# Patient Record
Sex: FEMALE | Race: WHITE | NOT HISPANIC OR LATINO | Employment: OTHER | URBAN - METROPOLITAN AREA
[De-identification: names, ages, dates, MRNs, and addresses within clinical notes are randomized per-mention and may not be internally consistent; named-entity substitution may affect disease eponyms.]

---

## 2017-03-03 ENCOUNTER — OFFICE VISIT (OUTPATIENT)
Dept: MEDICAL GROUP | Age: 82
End: 2017-03-03
Payer: MEDICARE

## 2017-03-03 VITALS
DIASTOLIC BLOOD PRESSURE: 88 MMHG | HEIGHT: 66 IN | TEMPERATURE: 99.7 F | WEIGHT: 158 LBS | BODY MASS INDEX: 25.39 KG/M2 | HEART RATE: 71 BPM | SYSTOLIC BLOOD PRESSURE: 140 MMHG | OXYGEN SATURATION: 97 %

## 2017-03-03 DIAGNOSIS — R21 SKIN RASH: ICD-10-CM

## 2017-03-03 PROCEDURE — G8420 CALC BMI NORM PARAMETERS: HCPCS | Performed by: FAMILY MEDICINE

## 2017-03-03 PROCEDURE — G8432 DEP SCR NOT DOC, RNG: HCPCS | Performed by: FAMILY MEDICINE

## 2017-03-03 PROCEDURE — G8482 FLU IMMUNIZE ORDER/ADMIN: HCPCS | Performed by: FAMILY MEDICINE

## 2017-03-03 PROCEDURE — 4040F PNEUMOC VAC/ADMIN/RCVD: CPT | Performed by: FAMILY MEDICINE

## 2017-03-03 PROCEDURE — 1101F PT FALLS ASSESS-DOCD LE1/YR: CPT | Performed by: FAMILY MEDICINE

## 2017-03-03 PROCEDURE — 1036F TOBACCO NON-USER: CPT | Performed by: FAMILY MEDICINE

## 2017-03-03 PROCEDURE — 99214 OFFICE O/P EST MOD 30 MIN: CPT | Performed by: FAMILY MEDICINE

## 2017-03-03 RX ORDER — ACYCLOVIR 400 MG/1
400 TABLET ORAL 3 TIMES DAILY
Qty: 21 TAB | Refills: 0 | Status: SHIPPED | OUTPATIENT
Start: 2017-03-03 | End: 2017-03-10

## 2017-03-03 ASSESSMENT — PATIENT HEALTH QUESTIONNAIRE - PHQ9: CLINICAL INTERPRETATION OF PHQ2 SCORE: 0

## 2017-03-03 NOTE — MR AVS SNAPSHOT
"        Lynne WOMACK Juancho   3/3/2017 10:30 AM   Office Visit   MRN: [id number]    Department:  [address] [place] [address]   Dept Phone:  [phone]    Description:  Female : 1931   Provider:  Marielena Russ M.D.           Reason for Visit     Rash face x 1 mo      Allergies as of 3/3/2017     Allergen Noted Reactions    Aspirin 2012       VonWillebrand's Dz    Iodine 2011         You were diagnosed with     Skin rash   [053490]         Vital Signs     Blood Pressure Pulse Temperature Height Weight Body Mass Index    140/88 mmHg 71 37.6 °C (99.7 °F) 1.676 m (5' 5.98\") 71.668 kg (158 lb) 25.51 kg/m2    Oxygen Saturation Last Menstrual Period Breastfeeding? Smoking Status          97% 1981 No Never Smoker         Basic Information     Date Of Birth Sex Race Ethnicity Preferred Language    1931 Female White Non- English      Your appointments     May 01, 2017 10:10 AM   Established Patient with Marielena Russ M.D.   [address] [id number] [phone]           You will be receiving a confirmation call a few days before your appointment from our automated call confirmation system.              Problem List              ICD-10-CM Priority Class Noted - Resolved    Von Willebrand disease (CMS-HCC) D68.0   2012 - Present    Hyperlipidemia with target LDL less than 100 E78.5   2012 - Present    HTN (hypertension) I10   2012 - Present    Hx of transfusion of packed red blood cells Z92.89   2012 - Present    Vitamin D insufficiency E55.9   3/23/2014 - Present    Preventative health care Z00.00   3/30/2014 - Present      Health Maintenance        Date Due Completion Dates    IMM DTaP/Tdap/Td Vaccine (2 - Td) 2020            Current Immunizations     13-VALENT PCV PREVNAR 10/2/2015    Influenza Vaccine Adult HD 2017, 2015, 2014    Pneumococcal polysaccharide vaccine (PPSV-23) " 12/6/2010    SHINGLES VACCINE 12/6/2010    Tdap Vaccine 12/6/2010      Below and/or attached are the medications your provider expects you to take. Review all of your home medications and newly ordered medications with your provider and/or pharmacist. Follow medication instructions as directed by your provider and/or pharmacist. Please keep your medication list with you and share with your provider. Update the information when medications are discontinued, doses are changed, or new medications (including over-the-counter products) are added; and carry medication information at all times in the event of emergency situations     Allergies:  ASPIRIN - (reactions not documented)     IODINE - (reactions not documented)               Medications  Valid as of: March 03, 2017 - 11:47 AM    Generic Name Brand Name Tablet Size Instructions for use    Acyclovir (Tab) ZOVIRAX 400 MG Take 1 Tab by mouth 3 times a day for 7 days.        Atenolol (Tab) TENORMIN 25 MG Take 1 Tab by mouth 2 times a day.        Cholecalciferol (Tab) cholecalciferol 1000 UNIT Take 2,000 Units by mouth every day.        Lovastatin (Tab) MEVACOR 40 MG Take 1 Tab by mouth every bedtime.        Nystatin-Triamcinolone (Cream) MYCOLOG 051061-5.1 UNIT/GM-% Thin layer to clean dry skin once per day to affected area for 5-7 days.        .                 Medicines prescribed today were sent to:     [id number] - [place], [place] - [address]    [address] [address] [facility] [address]    Phone: [phone] Fax: [phone]    Open 24 Hours?: No    [facility] - [place], [place] - [address]    [address] [facility] [address]    Phone: [phone] Fax: [phone]    Open 24 Hours?: No      Medication refill instructions:       If your prescription bottle indicates you have medication refills left, it is not necessary to call your provider’s office. Please contact your pharmacy and they will refill your medication.    If your prescription  bottle indicates you do not have any refills left, you may request refills at any time through one of the following ways: The online [employer] system (except Urgent Care), by calling your provider’s office, or by asking your pharmacy to contact your provider’s office with a refill request. Medication refills are processed only during regular business hours and may not be available until the next business day. Your provider may request additional information or to have a follow-up visit with you prior to refilling your medication.   *Please Note: Medication refills are assigned a new Rx number when refilled electronically. Your pharmacy may indicate that no refills were authorized even though a new prescription for the same medication is available at the pharmacy. Please request the medicine by name with the pharmacy before contacting your provider for a refill.           MyChart Status: Patient Declined

## 2017-03-03 NOTE — PROGRESS NOTES
"Chief Complaint   Patient presents with   • Rash     face x 1 mo     HPI  Tends to get rashes easily.   Itchy red bumps and pimples with swelling started face at nose and nasolabial folds 1 day ago.   1 month ago had same issue nose and lower face. Initially attributed symptoms to a new formulation of an oral medication however she never developed any systemic symptoms. Rash did seem to improve after she switched back to the previous formulation of oral medication and 2 days ago redness was gone and seemed nearly gone. Currently and during time of previous rash symptoms were Worse after hot shower. Mouth, and tongue spared. No blisters or pustules. Rash is painful and itchy.  Not precipitated by sun exposure.   Better with benadryl   No new lotion, soap, detergent, sheets, towels, topical or oral medicine. No similarly affected contacts. No pets in household. No known insect infestations in household.    Allergies: Aspirin and Iodine    ROS  No mouth or lip redness, itching, blisters, or swelling  No difficulty swallowing or breathing.  No cough or wheeze.      Objective:     Blood pressure 140/88, pulse 71, temperature 37.6 °C (99.7 °F), height 1.676 m (5' 5.98\"), weight 71.668 kg (158 lb), last menstrual period 09/23/1981, SpO2 97 %, not currently breastfeeding.    Physical Exam    Alert, oriented in no acute distress.  No conversational dyspnea. No cough or wheeze.  HEENT: Conjunctivae non-injected, no periorbital edema  Nares patent with no significant congestion or drainage.   Skin of face in the nasolabial folds with erythematous papules. No pustules. No vesicles appreciated. No open or closed comedones appreciated skin is hypersensitive to touch  Oral mucous membranes pink and moist with no lesions. No lip lesions appreciated  Neck supple with no cervical lymphadenopathy  Lungs: clear to auscultation bilaterally with good excursion.            Assessment:     1. Skin rash  acyclovir (ZOVIRAX) 400 MG tablet    " nystatin/triamcinolone (MYCOLOG) 490831-8.1 UNIT/GM-% Cream    Nonspecific dermatitis. We'll treat symptomatically.     Because patient experiencing pain we discussed the possibility of a herpetic infection. Acyclovir prescribed

## 2017-03-08 ENCOUNTER — RX ONLY (OUTPATIENT)
Age: 82
Setting detail: RX ONLY
End: 2017-03-08

## 2017-03-08 PROBLEM — L71.9 ROSACEA, UNSPECIFIED: Status: ACTIVE | Noted: 2017-03-08

## 2017-04-20 ENCOUNTER — RX ONLY (OUTPATIENT)
Age: 82
Setting detail: RX ONLY
End: 2017-04-20

## 2017-05-12 ENCOUNTER — TELEPHONE (OUTPATIENT)
Dept: MEDICAL GROUP | Age: 82
End: 2017-05-12

## 2017-05-12 NOTE — TELEPHONE ENCOUNTER
ESTABLISHED PATIENT PRE-VISIT PLANNING     Note: Patient will not be contacted if there is no indication to call.     1.  Reviewed note from last office visit with PCP and/or other med group provider: Yes    2.  If any orders were placed at last visit, do we have Results/Consult Notes?        •  Labs - Labs were not ordered at last office visit.       •  Imaging - Imaging was not ordered at last office visit.       •  Referrals - No referrals were ordered at last office visit.    3.  Immunizations were updated in Epic using WebIZ?: Epic matches WebIZ       •  Web Iz Recommendations: HEPATITIS A  HEPATITIS B MMR  TD    4.  Patient is due for the following Health Maintenance Topics:   Health Maintenance Due   Topic Date Due   • Annual Wellness Visit  10/02/2016       - Patient is up-to-date on all Health Maintenance topics. No records have been requested at this time.    5.  Patient was not informed to arrive 15 min prior to their scheduled appointment and bring in their medication bottles.   Has Your Skin Lesion Been Treated?: not been treated Is This A New Presentation, Or A Follow-Up?: Skin Lesion

## 2017-05-15 ENCOUNTER — OFFICE VISIT (OUTPATIENT)
Dept: MEDICAL GROUP | Age: 82
End: 2017-05-15
Payer: MEDICARE

## 2017-05-15 VITALS
HEIGHT: 66 IN | OXYGEN SATURATION: 97 % | DIASTOLIC BLOOD PRESSURE: 80 MMHG | WEIGHT: 160 LBS | BODY MASS INDEX: 25.71 KG/M2 | SYSTOLIC BLOOD PRESSURE: 136 MMHG | HEART RATE: 82 BPM | TEMPERATURE: 97.9 F

## 2017-05-15 DIAGNOSIS — L71.9 ROSACEA: ICD-10-CM

## 2017-05-15 DIAGNOSIS — E55.9 VITAMIN D INSUFFICIENCY: ICD-10-CM

## 2017-05-15 DIAGNOSIS — E78.5 HYPERLIPIDEMIA WITH TARGET LDL LESS THAN 100: ICD-10-CM

## 2017-05-15 DIAGNOSIS — I10 ESSENTIAL HYPERTENSION: ICD-10-CM

## 2017-05-15 PROCEDURE — 99204 OFFICE O/P NEW MOD 45 MIN: CPT | Performed by: INTERNAL MEDICINE

## 2017-05-15 RX ORDER — METRONIDAZOLE 7.5 MG/G
1 GEL TOPICAL 2 TIMES DAILY
Qty: 1 TUBE | Refills: 3 | Status: SHIPPED | OUTPATIENT
Start: 2017-05-15 | End: 2017-08-15

## 2017-05-15 RX ORDER — MINOCYCLINE HYDROCHLORIDE 50 MG/1
CAPSULE ORAL
COMMUNITY
Start: 2017-04-20 | End: 2017-08-15

## 2017-05-15 RX ORDER — ACYCLOVIR 400 MG/1
TABLET ORAL
COMMUNITY
Start: 2017-03-03 | End: 2017-05-15

## 2017-05-15 ASSESSMENT — PAIN SCALES - GENERAL: PAINLEVEL: NO PAIN

## 2017-05-15 NOTE — MR AVS SNAPSHOT
"        Lynne WOMACK Juancho   5/15/2017 4:00 PM   Office Visit   MRN: [id number]    Department:  [address] [place] [address]   Dept Phone:  [phone]    Description:  Female : 1931   Provider:  Zoe Mitchell M.D.           Reason for Visit     Establish Care           Allergies as of 5/15/2017     Allergen Noted Reactions    Aspirin 2012       VonWillebrand's Dz    Iodine 2011         You were diagnosed with     Rosacea   [695.3.ICD-9-CM]       Hyperlipidemia with target LDL less than 100   [992532]       Vitamin D insufficiency   [763305]       Essential hypertension   [8209351]         Vital Signs     Blood Pressure Pulse Temperature Height Weight Body Mass Index    136/80 mmHg 82 36.6 °C (97.9 °F) 1.674 m (5' 5.9\") 72.576 kg (160 lb) 25.90 kg/m2    Oxygen Saturation Last Menstrual Period Breastfeeding? Smoking Status          97% 1981 No Never Smoker         Basic Information     Date Of Birth Sex Race Ethnicity Preferred Language    1931 Female White Non- English      Your appointments     Aug 15, 2017  9:00 AM   Established Patient with Zoe Mitchell M.D.   [address] [address] [phone]           You will be receiving a confirmation call a few days before your appointment from our automated call confirmation system.              Problem List              ICD-10-CM Priority Class Noted - Resolved    Von Willebrand disease (CMS-HCC) D68.0   2012 - Present    Hyperlipidemia with target LDL less than 100 E78.5   2012 - Present    Essential hypertension I10   2012 - Present    Hx of transfusion of packed red blood cells Z92.89   2012 - Present    Vitamin D insufficiency E55.9   3/23/2014 - Present    Preventative health care Z00.00   3/30/2014 - Present    Rosacea L71.9   5/15/2017 - Present      Health Maintenance        Date Due Completion Dates    IMM DTaP/Tdap/Td Vaccine (2 - Td) 2020 " 12/6/2010            Current Immunizations     13-VALENT PCV PREVNAR 10/2/2015    Influenza Vaccine Adult HD 1/17/2017, 9/29/2015, 9/29/2014    Pneumococcal polysaccharide vaccine (PPSV-23) 12/6/2010    SHINGLES VACCINE 12/6/2010    Tdap Vaccine 12/6/2010      Below and/or attached are the medications your provider expects you to take. Review all of your home medications and newly ordered medications with your provider and/or pharmacist. Follow medication instructions as directed by your provider and/or pharmacist. Please keep your medication list with you and share with your provider. Update the information when medications are discontinued, doses are changed, or new medications (including over-the-counter products) are added; and carry medication information at all times in the event of emergency situations     Allergies:  ASPIRIN - (reactions not documented)     IODINE - (reactions not documented)               Medications  Valid as of: May 15, 2017 -  4:34 PM    Generic Name Brand Name Tablet Size Instructions for use    Atenolol (Tab) TENORMIN 25 MG Take 1 Tab by mouth 2 times a day.        Cholecalciferol (Tab) cholecalciferol 1000 UNIT Take 2,000 Units by mouth every day.        Lovastatin (Tab) MEVACOR 40 MG Take 1 Tab by mouth every bedtime.        MetroNIDAZOLE (Gel) METROGEL 0.75 % Apply 1 Application to affected area(s) 2 times a day.        Minocycline HCl (Cap) MINOCIN 50 MG         .                 Medicines prescribed today were sent to:     [facility] - [place], [place] - [address]    [address] [facility] [address]    Phone: [phone] Fax: [phone]    Open 24 Hours?: No    [facility] - [place], [place] - [address]    [address] [place] [address]    Phone: [phone] Fax: [phone]    Open 24 Hours?: No      Medication refill instructions:       If your prescription bottle indicates you have medication refills left, it is not necessary to call your  provider’s office. Please contact your pharmacy and they will refill your medication.    If your prescription bottle indicates you do not have any refills left, you may request refills at any time through one of the following ways: The online [employer] system (except Urgent Care), by calling your provider’s office, or by asking your pharmacy to contact your provider’s office with a refill request. Medication refills are processed only during regular business hours and may not be available until the next business day. Your provider may request additional information or to have a follow-up visit with you prior to refilling your medication.   *Please Note: Medication refills are assigned a new Rx number when refilled electronically. Your pharmacy may indicate that no refills were authorized even though a new prescription for the same medication is available at the pharmacy. Please request the medicine by name with the pharmacy before contacting your provider for a refill.        Your To Do List     Future Labs/Procedures Complete By Expires    CBC WITH DIFFERENTIAL  As directed 5/16/2018    COMP METABOLIC PANEL  As directed 5/16/2018    LIPID PROFILE  As directed 5/16/2018    VITAMIN D,25 HYDROXY  As directed 5/16/2018         [employer] Access Code: [email or web address]  Expires: 6/14/2017  4:34 PM    [employer]  A secure, online tool to manage your health information     [employer]’s [employer]® is a secure, online tool that connects you to your personalized health information from the privacy of your home -- day or night - making it very easy for you to manage your healthcare. Once the activation process is completed, you can even access your medical information using the [employer] jeanette, which is available for free in the [employer] Jeanette store or Google Play store.     [employer] provides the following levels of access (as shown below):   My Chart Features   [facility] Doctor [facility]  Specialists [facility]  Urgent  Care  [facility]  Doctor   Email your healthcare team securely and privately 24/7 X X X    Manage appointments: schedule your next appointment; view details of past/upcoming appointments X      Request prescription refills. X      View recent personal medical records, including lab and immunizations X X X X   View health record, including health history, allergies, medications X X X X   Read reports about your outpatient visits, procedures, consult and ER notes X X X X   See your discharge summary, which is a recap of your hospital and/or ER visit that includes your diagnosis, lab results, and care plan. X X       How to register for [employer]:  1. Go to  [email or web address].  2. Click on the Sign Up Now box, which takes you to the New Member Sign Up page. You will need to provide the following information:  a. Enter your [employer] Access Code exactly as it appears at the top of this page. (You will not need to use this code after you’ve completed the sign-up process. If you do not sign up before the expiration date, you must request a new code.)   b. Enter your date of birth.   c. Enter your home email address.   d. Click Submit, and follow the next screen’s instructions.  3. Create a [employer] ID. This will be your [employer] login ID and cannot be changed, so think of one that is secure and easy to remember.  4. Create a [employer] password. You can change your password at any time.  5. Enter your Password Reset Question and Answer. This can be used at a later time if you forget your password.   6. Enter your e-mail address. This allows you to receive e-mail notifications when new information is available in [employer].  7. Click Sign Up. You can now view your health information.    For assistance activating your [employer] account, call [phone]

## 2017-05-16 ENCOUNTER — RX ONLY (OUTPATIENT)
Age: 82
Setting detail: RX ONLY
End: 2017-05-16

## 2017-05-16 NOTE — PROGRESS NOTES
Norma Dawkins is a 85 y.o. female here to establish care and the evaluation and management of:      HPI:    Vitamin D insufficiency  Patient reported that she forgets to take her vitamin D regularly. She was told to take vitamin D3 2000 units daily. She has not rechecked her vitamin D since 2014.    Rosacea  Patient has rosacea and was evaluated by dermatologist. She tried sample of Avar(Sodium Sulfacetamide 10% and sulfur 2%) facial cleansing and cream without improvement. She reported a lot of dry skin from using Avar. She is taking minocycline 50 mg daily as prescribed by dermatologist. She requested to try different topical as she does not feel improvement with Avar. She stated that she avoids direct sun exposure.    Hyperlipidemia with target LDL less than 100  She is taking lovastatin 40 mg every evening. She denies side effects from taking lovastatin. Reviewed previous blood test with patient today. Her liver enzymes within normal. She has slightly elevated LDL cholesterol and total cholesterol.    Results for NORMA DAWKINS (MRN [id number]) as of 5/15/2017 18:51   Ref. Range 9/15/2016 07:11   Cholesterol,Tot Latest Ref Range: 100-199 mg/dL 211 (H)   Triglycerides Latest Ref Range: 0-149 mg/dL 112   HDL Latest Ref Range: >=40 mg/dL 84   LDL Latest Ref Range: <100 mg/dL 105 (H)       Essential hypertension  Patient is taking atenolol 25 mg twice a day her blood pressure and heart rate are well controlled with current regimens. She denies side effects from taking atenolol.    Current medicines (including changes today)  Current Outpatient Prescriptions   Medication Sig Dispense Refill   • minocycline (MINOCIN) 50 MG Cap      • metronidazole (METROGEL) 0.75 % gel Apply 1 Application to affected area(s) 2 times a day. 1 Tube 3   • lovastatin (MEVACOR) 40 MG tablet Take 1 Tab by mouth every bedtime. 90 Tab 3   • atenolol (TENORMIN) 25 MG Tab Take 1 Tab by mouth 2 times a day. 180 Tab 2   • vitamin D  (CHOLECALCIFEROL) 1000 UNIT TABS Take 2,000 Units by mouth every day.       No current facility-administered medications for this visit.     She  has a past medical history of Von Willebrand disease (CMS-HCC) (dx 1955); Hyperlipidemia LDL goal < 100; HTN (hypertension); transfusion of packed red blood cells; and Vitamin D insufficiency (3/23/2014).  She  has past surgical history that includes cataract phaco with iol (2012); cataract phaco with iol (2012); and hysterectomy, vaginal.  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 Has 6 grandchildren.      Family History   Problem Relation Age of Onset   • Cancer Mother      d 33 yo. Unk type.   • Heart Disease Sister      d 69   • Cancer Brother      d 73 brain cancer     Family Status   Relation Status Death Age   • Father  104     Old Age   • Mother  32     cancer (unknown type)   • Sister  69     CAD   • Brother  73     cancer (brain)   • Sister Alive    • Brother Alive      Health Maintenance Topics with due status: Overdue       Topic Date Due    Annual Wellness Visit 10/02/2016         ROS    Gen.: Denied weight change, appetite change, fatigue.  ENT: Denied sinus tenderness, nasal congestion, runny nose, or sore throat  CVS: Denied chest pain, palpitations, legs swelling.  Respiratory: Denied cough, shortness of breath, wheezing.  GI: Denied abdominal pain, constipation or diarrhea.  Endocrine: Denied temperature intolerance, increased frequency of urination, polyphagia or polydipsia.  Musculoskeletal: Denied back pain or joint pain.    All other systems reviewed and are negative     Objective:     Blood pressure 136/80, pulse  "82, temperature 36.6 °C (97.9 °F), height 1.674 m (5' 5.9\"), weight 72.576 kg (160 lb), last menstrual period 09/23/1981, SpO2 97 %, not currently breastfeeding. Body mass index is 25.9 kg/(m^2).  Physical Exam:    Constitutional: Well nourished and Well developed, Alert, no distress.  Skin: Warm, dry, good turgor, no rashes in visible areas.  Eye: Equal, round and reactive, conjunctiva clear, lids normal.  ENMT: Lips without lesions, good dentition, oropharynx clear.  Neck: Trachea midline, no masses, no thyromegaly. No cervical or supraclavicular lymphadenopathy.  Respiratory: Unlabored respiratory effort, lungs clear to auscultation, no wheezes, no ronchi.  Cardiovascular: Normal S1, S2, no murmur, no edema.   Abdomen: Soft, non distended, non-tender, no masses, no hepatosplenomegaly. Bowel sound normal.  Extremities: No edema, no clubbing, no cyanosis.  Psych: Alert and oriented x3, normal affect and mood.        Assessment and Plan:   The following treatment plan was discussed       1. Rosacea  - We will try metronidazole gel. Discussed the use and side effect of metronidazole gel with patient. She will continue minocycline 50 mg once a day as instructed by dermatologist and she will follow with her dermatologist in June.  - metronidazole (METROGEL) 0.75 % gel; Apply 1 Application to affected area(s) 2 times a day.  Dispense: 1 Tube; Refill: 3  - CBC WITH DIFFERENTIAL; Future  - COMP METABOLIC PANEL; Future    2. Hyperlipidemia with target LDL less than 100  - Well-controlled. Continue current regimens. Recheck lab 1-2 weeks before next follow up visit.  - COMP METABOLIC PANEL; Future  - LIPID PROFILE; Future    3. Vitamin D insufficiency  - Patient does not take vitamin D supplements regularly. She is advised to take vitamin D supplement at least 2000 units daily. We will recheck vitamin D in 3 months.  - VITAMIN D,25 HYDROXY; Future    4. Essential hypertension  - Well-controlled. Continue current regimens. " Recheck lab 1-2 weeks before next follow up visit.  - Recommend to monitor blood pressure and heart rate at home.  - CBC WITH DIFFERENTIAL; Future  - COMP METABOLIC PANEL; Future        Records requested.  Followup: Return in about 3 months (around 8/15/2017), or if symptoms worsen or fail to improve, for rosacea, hyperlipidemia, hypertension, vitamin D insufficiency, lab review. sooner should new symptoms or problems arise.      Please note that this dictation was created using voice recognition software. I have made every reasonable attempt to correct obvious errors, but I expect that there may have unintended errors in text, spelling, punctuation, or grammar that I did not discover.

## 2017-05-16 NOTE — ASSESSMENT & PLAN NOTE
Patient is taking atenolol 25 mg twice a day her blood pressure and heart rate are well controlled with current regimens. She denies side effects from taking atenolol.

## 2017-05-16 NOTE — ASSESSMENT & PLAN NOTE
She is taking lovastatin 40 mg every evening. She denies side effects from taking lovastatin. Reviewed previous blood test with patient today. Her liver enzymes within normal. She has slightly elevated LDL cholesterol and total cholesterol.    Results for NORMA DHALIWAL (MRN [id number]) as of 5/15/2017 18:51   Ref. Range 9/15/2016 07:11   Cholesterol,Tot Latest Ref Range: 100-199 mg/dL 211 (H)   Triglycerides Latest Ref Range: 0-149 mg/dL 112   HDL Latest Ref Range: >=40 mg/dL 84   LDL Latest Ref Range: <100 mg/dL 105 (H)

## 2017-05-16 NOTE — ASSESSMENT & PLAN NOTE
Patient reported that she forgets to take her vitamin D regularly. She was told to take vitamin D3 2000 units daily. She has not rechecked her vitamin D since 2014.

## 2017-05-16 NOTE — ASSESSMENT & PLAN NOTE
Patient has rosacea and was evaluated by dermatologist. She tried sample of Avar(Sodium Sulfacetamide 10% and sulfur 2%) facial cleansing and cream without improvement. She reported a lot of dry skin from using Avar. She is taking minocycline 50 mg daily as prescribed by dermatologist. She requested to try different topical as she does not feel improvement with Avar. She stated that she avoids direct sun exposure.

## 2017-07-14 DIAGNOSIS — I10 ESSENTIAL HYPERTENSION: ICD-10-CM

## 2017-07-14 RX ORDER — ATENOLOL 25 MG/1
25 TABLET ORAL 2 TIMES DAILY
Qty: 180 TAB | Refills: 3 | Status: SHIPPED | OUTPATIENT
Start: 2017-07-14 | End: 2017-07-18 | Stop reason: SDUPTHER

## 2017-07-18 ENCOUNTER — TELEPHONE (OUTPATIENT)
Dept: MEDICAL GROUP | Age: 82
End: 2017-07-18

## 2017-07-18 DIAGNOSIS — I10 ESSENTIAL HYPERTENSION: ICD-10-CM

## 2017-07-18 RX ORDER — ATENOLOL 25 MG/1
25 TABLET ORAL 2 TIMES DAILY
Qty: 180 TAB | Refills: 3 | Status: SHIPPED | OUTPATIENT
Start: 2017-07-18 | End: 2018-07-16 | Stop reason: SDUPTHER

## 2017-07-18 NOTE — TELEPHONE ENCOUNTER
Printed out Atenolol 25 mg bid prescription for patient as her regular pharmacy does not carry Atenolol with 25 mg dose.     Zoe Mitchell M.D.

## 2017-08-07 ENCOUNTER — HOSPITAL ENCOUNTER (OUTPATIENT)
Dept: LAB | Facility: MEDICAL CENTER | Age: 82
End: 2017-08-07
Attending: INTERNAL MEDICINE
Payer: MEDICARE

## 2017-08-07 DIAGNOSIS — E78.5 HYPERLIPIDEMIA WITH TARGET LDL LESS THAN 100: ICD-10-CM

## 2017-08-07 DIAGNOSIS — I10 ESSENTIAL HYPERTENSION: ICD-10-CM

## 2017-08-07 DIAGNOSIS — L71.9 ROSACEA: ICD-10-CM

## 2017-08-07 DIAGNOSIS — E55.9 VITAMIN D INSUFFICIENCY: ICD-10-CM

## 2017-08-07 LAB
25(OH)D3 SERPL-MCNC: 32 NG/ML (ref 30–100)
ALBUMIN SERPL BCP-MCNC: 3.9 G/DL (ref 3.2–4.9)
ALBUMIN/GLOB SERPL: 1.8 G/DL
ALP SERPL-CCNC: 73 U/L (ref 30–99)
ALT SERPL-CCNC: 17 U/L (ref 2–50)
ANION GAP SERPL CALC-SCNC: 5 MMOL/L (ref 0–11.9)
AST SERPL-CCNC: 17 U/L (ref 12–45)
BASOPHILS # BLD AUTO: 1 % (ref 0–1.8)
BASOPHILS # BLD: 0.05 K/UL (ref 0–0.12)
BILIRUB SERPL-MCNC: 0.5 MG/DL (ref 0.1–1.5)
BUN SERPL-MCNC: 11 MG/DL (ref 8–22)
CALCIUM SERPL-MCNC: 9.1 MG/DL (ref 8.5–10.5)
CHLORIDE SERPL-SCNC: 107 MMOL/L (ref 96–112)
CHOLEST SERPL-MCNC: 180 MG/DL (ref 100–199)
CO2 SERPL-SCNC: 29 MMOL/L (ref 20–33)
CREAT SERPL-MCNC: 0.77 MG/DL (ref 0.5–1.4)
EOSINOPHIL # BLD AUTO: 0.21 K/UL (ref 0–0.51)
EOSINOPHIL NFR BLD: 4.2 % (ref 0–6.9)
ERYTHROCYTE [DISTWIDTH] IN BLOOD BY AUTOMATED COUNT: 44 FL (ref 35.9–50)
GFR SERPL CREATININE-BSD FRML MDRD: >60 ML/MIN/1.73 M 2
GLOBULIN SER CALC-MCNC: 2.2 G/DL (ref 1.9–3.5)
GLUCOSE SERPL-MCNC: 79 MG/DL (ref 65–99)
HCT VFR BLD AUTO: 39.8 % (ref 37–47)
HDLC SERPL-MCNC: 88 MG/DL
HGB BLD-MCNC: 12.7 G/DL (ref 12–16)
IMM GRANULOCYTES # BLD AUTO: 0.01 K/UL (ref 0–0.11)
IMM GRANULOCYTES NFR BLD AUTO: 0.2 % (ref 0–0.9)
LDLC SERPL CALC-MCNC: 76 MG/DL
LYMPHOCYTES # BLD AUTO: 1.41 K/UL (ref 1–4.8)
LYMPHOCYTES NFR BLD: 28 % (ref 22–41)
MCH RBC QN AUTO: 29.8 PG (ref 27–33)
MCHC RBC AUTO-ENTMCNC: 31.9 G/DL (ref 33.6–35)
MCV RBC AUTO: 93.4 FL (ref 81.4–97.8)
MONOCYTES # BLD AUTO: 0.49 K/UL (ref 0–0.85)
MONOCYTES NFR BLD AUTO: 9.7 % (ref 0–13.4)
NEUTROPHILS # BLD AUTO: 2.87 K/UL (ref 2–7.15)
NEUTROPHILS NFR BLD: 56.9 % (ref 44–72)
NRBC # BLD AUTO: 0 K/UL
NRBC BLD AUTO-RTO: 0 /100 WBC
PLATELET # BLD AUTO: 271 K/UL (ref 164–446)
PMV BLD AUTO: 10.6 FL (ref 9–12.9)
POTASSIUM SERPL-SCNC: 3.7 MMOL/L (ref 3.6–5.5)
PROT SERPL-MCNC: 6.1 G/DL (ref 6–8.2)
RBC # BLD AUTO: 4.26 M/UL (ref 4.2–5.4)
SODIUM SERPL-SCNC: 141 MMOL/L (ref 135–145)
TRIGL SERPL-MCNC: 82 MG/DL (ref 0–149)
WBC # BLD AUTO: 5 K/UL (ref 4.8–10.8)

## 2017-08-07 PROCEDURE — 36415 COLL VENOUS BLD VENIPUNCTURE: CPT

## 2017-08-07 PROCEDURE — 82306 VITAMIN D 25 HYDROXY: CPT

## 2017-08-07 PROCEDURE — 80061 LIPID PANEL: CPT

## 2017-08-07 PROCEDURE — 80053 COMPREHEN METABOLIC PANEL: CPT

## 2017-08-07 PROCEDURE — 85025 COMPLETE CBC W/AUTO DIFF WBC: CPT

## 2017-08-15 ENCOUNTER — OFFICE VISIT (OUTPATIENT)
Dept: MEDICAL GROUP | Age: 82
End: 2017-08-15
Payer: MEDICARE

## 2017-08-15 VITALS
WEIGHT: 159.6 LBS | BODY MASS INDEX: 25.65 KG/M2 | TEMPERATURE: 98.1 F | DIASTOLIC BLOOD PRESSURE: 80 MMHG | HEIGHT: 66 IN | HEART RATE: 70 BPM | OXYGEN SATURATION: 98 % | SYSTOLIC BLOOD PRESSURE: 136 MMHG

## 2017-08-15 DIAGNOSIS — L71.9 ROSACEA: ICD-10-CM

## 2017-08-15 DIAGNOSIS — I10 ESSENTIAL HYPERTENSION: ICD-10-CM

## 2017-08-15 DIAGNOSIS — E78.5 HYPERLIPIDEMIA WITH TARGET LDL LESS THAN 100: ICD-10-CM

## 2017-08-15 DIAGNOSIS — Z02.9 ADMINISTRATIVE ENCOUNTER: ICD-10-CM

## 2017-08-15 DIAGNOSIS — D68.00 VON WILLEBRAND DISEASE (HCC): ICD-10-CM

## 2017-08-15 DIAGNOSIS — E55.9 VITAMIN D INSUFFICIENCY: ICD-10-CM

## 2017-08-15 PROCEDURE — 99214 OFFICE O/P EST MOD 30 MIN: CPT | Performed by: INTERNAL MEDICINE

## 2017-08-15 RX ORDER — DOXYCYCLINE HYCLATE 100 MG
TABLET ORAL
COMMUNITY
Start: 2017-07-05 | End: 2017-08-15

## 2017-08-15 RX ORDER — DOXYCYCLINE HYCLATE 100 MG/1
TABLET, DELAYED RELEASE ORAL
COMMUNITY
Start: 2017-07-26 | End: 2018-02-14

## 2017-08-15 RX ORDER — CLINDAMYCIN PHOSPHATE 10 UG/ML
LOTION TOPICAL
COMMUNITY
Start: 2017-06-29 | End: 2019-11-19

## 2017-08-15 ASSESSMENT — PAIN SCALES - GENERAL: PAINLEVEL: NO PAIN

## 2017-08-15 NOTE — MR AVS SNAPSHOT
"        Lynne WOMACK Juancho   8/15/2017 9:00 AM   Office Visit   MRN: [id number]    Department:  [address] [place] [address]   Dept Phone:  [phone]    Description:  Female : 1931   Provider:  Zoe Mitchell M.D.           Reason for Visit     Hypertension lab review    Hyperlipidemia     Vitamin D Deficiency     Other DMV exam      Allergies as of 8/15/2017     Allergen Noted Reactions    Aspirin 2012       VonWillebrand's Dz    Iodine 2011         You were diagnosed with     Essential hypertension   [3413701]       Hyperlipidemia with target LDL less than 100   [038022]       Von Willebrand disease (CMS-[facility])   [360797]       Rosacea   [695.3.ICD-9-CM]       Vitamin D insufficiency   [041399]       Administrative encounter   [609846]         Vital Signs     Blood Pressure Pulse Temperature Height Weight Body Mass Index    136/80 mmHg 70 36.7 °C (98.1 °F) 1.674 m (5' 5.91\") 72.394 kg (159 lb 9.6 oz) 25.83 kg/m2    Oxygen Saturation Last Menstrual Period Breastfeeding? Smoking Status          98% 1981 No Never Smoker         Basic Information     Date Of Birth Sex Race Ethnicity Preferred Language    1931 Female White Non- English      Your appointments     2018  8:40 AM   Established Patient with Zoe Mitchell M.D.   [address] [address] [phone]           You will be receiving a confirmation call a few days before your appointment from our automated call confirmation system.              Problem List              ICD-10-CM Priority Class Noted - Resolved    Von Willebrand disease (CMS-HCC) D68.0   2012 - Present    Hyperlipidemia with target LDL less than 100 E78.5   2012 - Present    Essential hypertension I10   2012 - Present    Hx of transfusion of packed red blood cells Z92.89   2012 - Present    Vitamin D insufficiency E55.9   3/23/2014 - Present    Preventative health care Z00.00   " 3/30/2014 - Present    Nela L71.9   5/15/2017 - Present      Health Maintenance        Date Due Completion Dates    IMM INFLUENZA (1) 9/1/2017 1/17/2017, 9/29/2015, 9/29/2014    IMM DTaP/Tdap/Td Vaccine (2 - Td) 12/6/2020 12/6/2010            Current Immunizations     13-VALENT PCV PREVNAR 10/2/2015    Influenza Vaccine Adult HD 1/17/2017, 9/29/2015, 9/29/2014    Pneumococcal polysaccharide vaccine (PPSV-23) 12/6/2010    SHINGLES VACCINE 12/6/2010    Tdap Vaccine 12/6/2010      Below and/or attached are the medications your provider expects you to take. Review all of your home medications and newly ordered medications with your provider and/or pharmacist. Follow medication instructions as directed by your provider and/or pharmacist. Please keep your medication list with you and share with your provider. Update the information when medications are discontinued, doses are changed, or new medications (including over-the-counter products) are added; and carry medication information at all times in the event of emergency situations     Allergies:  ASPIRIN - (reactions not documented)     IODINE - (reactions not documented)               Medications  Valid as of: August 15, 2017 -  9:25 AM    Generic Name Brand Name Tablet Size Instructions for use    Atenolol (Tab) TENORMIN 25 MG Take 1 Tab by mouth 2 times a day.        Cholecalciferol (Tab) cholecalciferol 1000 UNIT Take 2,000 Units by mouth every day.        Clindamycin Phosphate (Lotion) CLINDAMYCIN PHOSPHATE(TOPICAL) 1 %         Doxycycline Hyclate (Tablet Delayed Response) Doxycycline Hyclate 100 MG         Lovastatin (Tab) MEVACOR 40 MG Take 1 Tab by mouth every bedtime.        .                 Medicines prescribed today were sent to:     [facility] #[id number] - [place] - [address]    [address] [place] [address]    Phone: [phone] Fax: [phone]    Open 24 Hours?: No    [facility] - [place], [place] - [address]    [address] [place] [address]    Phone: [phone] Fax: [phone]    Open 24 Hours?: No      Medication refill instructions:       If your prescription bottle indicates you have medication refills left, it is not necessary to call your provider’s office. Please contact your pharmacy and they will refill your medication.    If your prescription bottle indicates you do not have any refills left, you may request refills at any time through one of the following ways: The online Bulu Box system (except Urgent Care), by calling your provider’s office, or by asking your pharmacy to contact your provider’s office with a refill request. Medication refills are processed only during regular business hours and may not be available until the next business day. Your provider may request additional information or to have a follow-up visit with you prior to refilling your medication.   *Please Note: Medication refills are assigned a new Rx number when refilled electronically. Your pharmacy may indicate that no refills were authorized even though a new prescription for the same medication is available at the pharmacy. Please request the medicine by name with the pharmacy before contacting your provider for a refill.        Your To Do List     Future Labs/Procedures Complete By Expires    CBC WITH DIFFERENTIAL  As directed 8/16/2018    COMP METABOLIC PANEL  As directed 8/16/2018    LIPID PROFILE  As directed 8/16/2018    VITAMIN D,25 HYDROXY  As directed 8/16/2018         Bulu Box Access Code: [email or web address]  Expires: 9/14/2017  9:25 AM    Bulu Box  A secure, online tool to manage your health information     [employer]® is a secure, online tool that connects you to your personalized health information from the privacy of your home -- day or night - making it very easy for you to manage your healthcare. Once the activation process is completed, you can even access your medical information using the  [employer] Network jeanette, which is available for free in the Apple Jeanette store or Google Play store.     Shift Network provides the following levels of access (as shown below):   My Chart Features   [facility] Doctor [facility]  Specialists [facility]  Urgent  Care Non-[facility]  Primary Care  Doctor   Email your healthcare team securely and privately 24/7 X X X    Manage appointments: schedule your next appointment; view details of past/upcoming appointments X      Request prescription refills. X      View recent personal medical records, including lab and immunizations X X X X   View health record, including health history, allergies, medications X X X X   Read reports about your outpatient visits, procedures, consult and ER notes X X X X   See your discharge summary, which is a recap of your hospital and/or ER visit that includes your diagnosis, lab results, and care plan. X X       How to register for Shift Network:  1. Go to  [email or web address].  2. Click on the Sign Up Now box, which takes you to the New Member Sign Up page. You will need to provide the following information:  a. Enter your Shift Network Access Code exactly as it appears at the top of this page. (You will not need to use this code after you’ve completed the sign-up process. If you do not sign up before the expiration date, you must request a new code.)   b. Enter your date of birth.   c. Enter your home email address.   d. Click Submit, and follow the next screen’s instructions.  3. Create a Shift Network ID. This will be your Shift Network login ID and cannot be changed, so think of one that is secure and easy to remember.  4. Create a Shift Network password. You can change your password at any time.  5. Enter your Password Reset Question and Answer. This can be used at a later time if you forget your password.   6. Enter your e-mail address. This allows you to receive e-mail notifications when new information is available in Shift Network.  7. Click Sign Up. You can now view your health  information.    For assistance activating your [employer] account, call [phone]

## 2017-08-15 NOTE — ASSESSMENT & PLAN NOTE
Patient is taking atenolol 25 mg twice a day. Her blood pressure is well controlled with current regimens. She denies side effects from taking it. Her recent CMP on 8/7/17 are within normal.

## 2017-08-15 NOTE — PROGRESS NOTES
Subjective:   Norma Dawkins is a 85 y.o. female here today for evaluation and management of:      Essential hypertension  Patient is taking atenolol 25 mg twice a day. Her blood pressure is well controlled with current regimens. She denies side effects from taking it. Her recent CMP on 8/7/17 are within normal.    Vitamin D insufficiency  Patient started taking vitamin D 2000 units daily. Her recent vitamin D level was 32 on 8/7/17.    Hyperlipidemia with target LDL less than 100  Her recent lipid panel improved. She is taking lovastatin 40 mg every evening without any side effect. Liver enzymes are within normal. I reviewed her recent blood tests with her today.    Results for NORMA DAWKINS (MRN [id number]) as of 8/15/2017 09:20   Ref. Range 8/7/2017 07:17   Cholesterol,Tot Latest Ref Range: 100-199 mg/dL 180   Triglycerides Latest Ref Range: 0-149 mg/dL 82   HDL Latest Ref Range: >=40 mg/dL 88   LDL Latest Ref Range: <100 mg/dL 76       Von Willebrand disease  Patient has von Willebrand disease that was diagnosed in 1950s. She does not have any acute bleeding or abnormal bleeding. She used to have nosebleed in summertime and she was cauterized in the ER 2 years ago in 2015. Since then she has not had recurrent nosebleed. She was treated with multiple infusion in the past. She does not have any acute or abnormal bleeding currently.    Rosacea  Patient stated that her dermatologist change regimens for antibiotic treatment. She is currently taking doxycycline 100 mg daily and clindamycin topical. She stated that her symptoms improved with current regimen and she denies side effects from using clindamycin lotion and taking doxycycline. Patient reported that she does not feel itchiness and pain on her face since new regimens. Her symptoms can be triggered by heat exposure and drinking wine.         Current medicines (including changes today)  Current Outpatient Prescriptions   Medication Sig Dispense Refill   •  "CLINDAMYCIN PHOSPHATE,TOPICAL, 1 % Lotion      • Doxycycline Hyclate 100 MG Tablet Delayed Response      • atenolol (TENORMIN) 25 MG Tab Take 1 Tab by mouth 2 times a day. 180 Tab 3   • lovastatin (MEVACOR) 40 MG tablet Take 1 Tab by mouth every bedtime. 90 Tab 3   • vitamin D (CHOLECALCIFEROL) 1000 UNIT TABS Take 2,000 Units by mouth every day.       No current facility-administered medications for this visit.     She  has a past medical history of Von Willebrand disease (CMS-HCC) (dx 1955); Hyperlipidemia LDL goal < 100; HTN (hypertension); transfusion of packed red blood cells; and Vitamin D insufficiency (3/23/2014).    ROS   No chest pain, no shortness of breath, no abdominal pain       Objective:     Blood pressure 136/80, pulse 70, temperature 36.7 °C (98.1 °F), height 1.674 m (5' 5.91\"), weight 72.394 kg (159 lb 9.6 oz), last menstrual period 09/23/1981, SpO2 98 %, not currently breastfeeding. Body mass index is 25.83 kg/(m^2).   Physical Exam:  General: Alert, oriented and no acute distress.  Eye contact is good, speech goal directed, affect calm  HEENT: conjunctiva non-injected, sclera non-icteric.  Oral mucous membranes pink and moist with no lesions.  Pinna normal.  Lungs: Normal respiratory effort, clear to auscultation bilaterally with good excursion.  CV: regular rate and rhythm. No murmurs.   Abdomen: soft, non distended, nontender, Bowel sound normal.  Ext: no edema, color normal, vascularity normal, temperature normal        Assessment and Plan:   The following treatment plan was discussed     1. Essential hypertension  - Well-controlled. Continue current regimens, atenolol 25 mg twice a day. Recheck lab 1-2 weeks before next follow up visit.  - Recommend to monitor blood pressure and heart rate at home.  - CBC WITH DIFFERENTIAL; Future  - COMP METABOLIC PANEL; Future    2. Hyperlipidemia with target LDL less than 100  - Well-controlled. Continue current regimens, lovastatin 40 mg every evening. " Recheck lab 1-2 weeks before next follow up visit.  - Advised to eat low fat, low carbohydrate and high fiber diet as well as do cardio physical exercise regularly.   - LIPID PROFILE; Future    3. Von Willebrand disease (CMS-HCC)  - Chronic and stable. No active bleeding. Continue to monitor.  - CBC WITH DIFFERENTIAL; Future  - COMP METABOLIC PANEL; Future    4. Rosacea  - Improved with oral doxycycline and clindamycin topical. She is advised to follow with dermatologist as scheduled.  - Reviewed the potential side effects of doxycycline and clindamycin with patient.    5. Vitamin D insufficiency  - Improved. Continue vitamin D 2000 units daily. Recheck vitamin D. 6 months later.  - VITAMIN D,25 HYDROXY; Future    6. Administrative encounter  - Patient has DMV form to fill to renew her  license. She does not have any medical conditions that are contraindicated to drive. She is not taking any medication that will affect her driving  - Filled to DMV form and provided to patient.  - She also has eye exam with her eye doctor.      Followup: Return in about 6 months (around 2/15/2018), or if symptoms worsen or fail to improve, for hypertension, hyperlipidemia, vitamin D insufficiency, rosacea, lab review.      Please note that this dictation was created using voice recognition software. I have made every reasonable attempt to correct obvious errors, but I expect that there may have unintended errors in text, spelling, punctuation, or grammar that I did not discover.

## 2017-08-15 NOTE — ASSESSMENT & PLAN NOTE
Patient stated that her dermatologist change regimens for antibiotic treatment. She is currently taking doxycycline 100 mg daily and clindamycin topical. She stated that her symptoms improved with current regimen and she denies side effects from using clindamycin lotion and taking doxycycline. Patient reported that she does not feel itchiness and pain on her face since new regimens. Her symptoms can be triggered by heat exposure and drinking wine.

## 2017-08-15 NOTE — ASSESSMENT & PLAN NOTE
Her recent lipid panel improved. She is taking lovastatin 40 mg every evening without any side effect. Liver enzymes are within normal. I reviewed her recent blood tests with her today.    Results for NORMA DHALIWAL (MRN [id number]) as of 8/15/2017 09:20   Ref. Range 8/7/2017 07:17   Cholesterol,Tot Latest Ref Range: 100-199 mg/dL 180   Triglycerides Latest Ref Range: 0-149 mg/dL 82   HDL Latest Ref Range: >=40 mg/dL 88   LDL Latest Ref Range: <100 mg/dL 76

## 2017-08-15 NOTE — ASSESSMENT & PLAN NOTE
Patient has von Willebrand disease that was diagnosed in 1950s. She does not have any acute bleeding or abnormal bleeding. She used to have nosebleed in summertime and she was cauterized in the ER 2 years ago in 2015. Since then she has not had recurrent nosebleed. She was treated with multiple infusion in the past. She does not have any acute or abnormal bleeding currently.

## 2017-09-06 ENCOUNTER — PATIENT OUTREACH (OUTPATIENT)
Dept: HEALTH INFORMATION MANAGEMENT | Facility: OTHER | Age: 82
End: 2017-09-06

## 2017-09-14 NOTE — PROGRESS NOTES
Outcome: Left Message    Please transfer to Patient Outreach Team at [phone] when patient returns call.    Attempt # 2

## 2017-09-19 NOTE — PROGRESS NOTES
Outcome: Requested Call Back    Please transfer to Patient Outreach Team at [phone] when patient returns call.    Attempt # 3

## 2017-09-20 NOTE — PROGRESS NOTES
Outcome: Left Message    Please transfer to Patient Outreach Team at [phone] when patient returns call.    Attempt # 4

## 2017-10-16 DIAGNOSIS — E78.5 HYPERLIPIDEMIA WITH TARGET LDL LESS THAN 100: ICD-10-CM

## 2017-10-16 RX ORDER — LOVASTATIN 40 MG/1
40 TABLET ORAL
Qty: 90 TAB | Refills: 1 | Status: SHIPPED | OUTPATIENT
Start: 2017-10-16 | End: 2018-04-17 | Stop reason: SDUPTHER

## 2018-02-09 ENCOUNTER — HOSPITAL ENCOUNTER (OUTPATIENT)
Dept: LAB | Facility: MEDICAL CENTER | Age: 83
End: 2018-02-09
Attending: INTERNAL MEDICINE
Payer: MEDICARE

## 2018-02-09 LAB
25(OH)D3 SERPL-MCNC: 26 NG/ML (ref 30–100)
ALBUMIN SERPL BCP-MCNC: 4.4 G/DL (ref 3.2–4.9)
ALBUMIN/GLOB SERPL: 1.8 G/DL
ALP SERPL-CCNC: 77 U/L (ref 30–99)
ALT SERPL-CCNC: 12 U/L (ref 2–50)
ANION GAP SERPL CALC-SCNC: 7 MMOL/L (ref 0–11.9)
AST SERPL-CCNC: 14 U/L (ref 12–45)
BASOPHILS # BLD AUTO: 0.9 % (ref 0–1.8)
BASOPHILS # BLD: 0.06 K/UL (ref 0–0.12)
BILIRUB SERPL-MCNC: 0.6 MG/DL (ref 0.1–1.5)
BUN SERPL-MCNC: 13 MG/DL (ref 8–22)
CALCIUM SERPL-MCNC: 9.2 MG/DL (ref 8.5–10.5)
CHLORIDE SERPL-SCNC: 105 MMOL/L (ref 96–112)
CHOLEST SERPL-MCNC: 203 MG/DL (ref 100–199)
CO2 SERPL-SCNC: 28 MMOL/L (ref 20–33)
CREAT SERPL-MCNC: 0.77 MG/DL (ref 0.5–1.4)
EOSINOPHIL # BLD AUTO: 0.17 K/UL (ref 0–0.51)
EOSINOPHIL NFR BLD: 2.4 % (ref 0–6.9)
ERYTHROCYTE [DISTWIDTH] IN BLOOD BY AUTOMATED COUNT: 44.4 FL (ref 35.9–50)
GLOBULIN SER CALC-MCNC: 2.4 G/DL (ref 1.9–3.5)
GLUCOSE SERPL-MCNC: 92 MG/DL (ref 65–99)
HCT VFR BLD AUTO: 41.1 % (ref 37–47)
HDLC SERPL-MCNC: 98 MG/DL
HGB BLD-MCNC: 13.2 G/DL (ref 12–16)
IMM GRANULOCYTES # BLD AUTO: 0.02 K/UL (ref 0–0.11)
IMM GRANULOCYTES NFR BLD AUTO: 0.3 % (ref 0–0.9)
LDLC SERPL CALC-MCNC: 86 MG/DL
LYMPHOCYTES # BLD AUTO: 1.57 K/UL (ref 1–4.8)
LYMPHOCYTES NFR BLD: 22.6 % (ref 22–41)
MCH RBC QN AUTO: 29.8 PG (ref 27–33)
MCHC RBC AUTO-ENTMCNC: 32.1 G/DL (ref 33.6–35)
MCV RBC AUTO: 92.8 FL (ref 81.4–97.8)
MONOCYTES # BLD AUTO: 0.62 K/UL (ref 0–0.85)
MONOCYTES NFR BLD AUTO: 8.9 % (ref 0–13.4)
NEUTROPHILS # BLD AUTO: 4.52 K/UL (ref 2–7.15)
NEUTROPHILS NFR BLD: 64.9 % (ref 44–72)
NRBC # BLD AUTO: 0 K/UL
NRBC BLD-RTO: 0 /100 WBC
PLATELET # BLD AUTO: 306 K/UL (ref 164–446)
PMV BLD AUTO: 10.3 FL (ref 9–12.9)
POTASSIUM SERPL-SCNC: 3.9 MMOL/L (ref 3.6–5.5)
PROT SERPL-MCNC: 6.8 G/DL (ref 6–8.2)
RBC # BLD AUTO: 4.43 M/UL (ref 4.2–5.4)
SODIUM SERPL-SCNC: 140 MMOL/L (ref 135–145)
TRIGL SERPL-MCNC: 96 MG/DL (ref 0–149)
WBC # BLD AUTO: 7 K/UL (ref 4.8–10.8)

## 2018-02-09 PROCEDURE — 36415 COLL VENOUS BLD VENIPUNCTURE: CPT

## 2018-02-09 PROCEDURE — 85025 COMPLETE CBC W/AUTO DIFF WBC: CPT

## 2018-02-09 PROCEDURE — 80061 LIPID PANEL: CPT

## 2018-02-09 PROCEDURE — 80053 COMPREHEN METABOLIC PANEL: CPT

## 2018-02-09 PROCEDURE — 82306 VITAMIN D 25 HYDROXY: CPT

## 2018-02-14 ENCOUNTER — OFFICE VISIT (OUTPATIENT)
Dept: MEDICAL GROUP | Age: 83
End: 2018-02-14
Payer: MEDICARE

## 2018-02-14 VITALS
SYSTOLIC BLOOD PRESSURE: 134 MMHG | BODY MASS INDEX: 26.1 KG/M2 | HEIGHT: 66 IN | WEIGHT: 162.4 LBS | TEMPERATURE: 97.3 F | OXYGEN SATURATION: 97 % | DIASTOLIC BLOOD PRESSURE: 80 MMHG | HEART RATE: 72 BPM

## 2018-02-14 DIAGNOSIS — D68.00 VON WILLEBRAND DISEASE (HCC): ICD-10-CM

## 2018-02-14 DIAGNOSIS — E78.5 HYPERLIPIDEMIA WITH TARGET LDL LESS THAN 100: ICD-10-CM

## 2018-02-14 DIAGNOSIS — L71.9 ROSACEA: ICD-10-CM

## 2018-02-14 DIAGNOSIS — E55.9 VITAMIN D INSUFFICIENCY: ICD-10-CM

## 2018-02-14 DIAGNOSIS — I10 ESSENTIAL HYPERTENSION: ICD-10-CM

## 2018-02-14 PROCEDURE — 99214 OFFICE O/P EST MOD 30 MIN: CPT | Performed by: INTERNAL MEDICINE

## 2018-02-14 RX ORDER — DOXYCYCLINE HYCLATE 50 MG/1
CAPSULE ORAL
COMMUNITY
Start: 2018-01-11 | End: 2019-11-19

## 2018-02-14 ASSESSMENT — PATIENT HEALTH QUESTIONNAIRE - PHQ9: CLINICAL INTERPRETATION OF PHQ2 SCORE: 0

## 2018-02-14 ASSESSMENT — PAIN SCALES - GENERAL: PAINLEVEL: NO PAIN

## 2018-02-14 NOTE — ASSESSMENT & PLAN NOTE
Patient was diagnosed with Von Willebrand Disease in 1950's. She was treated with blood transfusion and factor VIII concentrator in the past when she was bleeding. She stated that she has not have any abnormal bleeding or blood in stool or blood in urine for many years. The last treatment was about in 2011. She does not follow with hematologist anymore. Her recent CBC showed normal hemoglobin, hematocrit. Patient is active and has regular physical exercise.

## 2018-02-14 NOTE — ASSESSMENT & PLAN NOTE
Patient is taking atenolol 25 mg twice a day chronically. She denies side effects from taking atenolol. Her blood pressure is stable with current regimens.

## 2018-02-14 NOTE — ASSESSMENT & PLAN NOTE
She is taking lovastatin 40 mg every evening. She denies side effects from taking lovastatin. She has normal liver enzymes. Her cholesterol is well controlled except slightly high total cholesterol at 203. I discussed her recent blood tests with her in clinic.    Results for NORMA DHALIWAL (MRN [id number]) as of 2/13/2018 22:03   Ref. Range 8/7/2017 07:17 2/9/2018 11:31   Cholesterol,Tot Latest Ref Range: 100 - 199 mg/dL 180 203 (H)   Triglycerides Latest Ref Range: 0 - 149 mg/dL 82 96   HDL Latest Ref Range: >=40 mg/dL 88 98   LDL Latest Ref Range: <100 mg/dL 76 86

## 2018-02-14 NOTE — PROGRESS NOTES
Subjective:   Norma Dawkins is a 86 y.o. female here today for evaluation and management of:      Vitamin D insufficiency  Patient still has low vitamin D at 26 on 2/9/18. She is taking over-the-counter vitamin D 2000 units daily currently. We discussed to increase the dose of vitamin D to 4000 units daily.    Hyperlipidemia with target LDL less than 100  She is taking lovastatin 40 mg every evening. She denies side effects from taking lovastatin. She has normal liver enzymes. Her cholesterol is well controlled except slightly high total cholesterol at 203. I discussed her recent blood tests with her in clinic.    Results for NORMA DAWKINS (MRN [id number]) as of 2/13/2018 22:03   Ref. Range 8/7/2017 07:17 2/9/2018 11:31   Cholesterol,Tot Latest Ref Range: 100 - 199 mg/dL 180 203 (H)   Triglycerides Latest Ref Range: 0 - 149 mg/dL 82 96   HDL Latest Ref Range: >=40 mg/dL 88 98   LDL Latest Ref Range: <100 mg/dL 76 86       Essential hypertension  Patient is taking atenolol 25 mg twice a day chronically. She denies side effects from taking atenolol. Her blood pressure is stable with current regimens.    Von Willebrand disease  Patient was diagnosed with Von Willebrand Disease in 1950's. She was treated with blood transfusion and factor VIII concentrator in the past when she was bleeding. She stated that she has not have any abnormal bleeding or blood in stool or blood in urine for many years. The last treatment was about in 2011. She does not follow with hematologist anymore. Her recent CBC showed normal hemoglobin, hematocrit. Patient is active and has regular physical exercise.     Rosacea  Patient stated that her dermatologist cut down doxycycline dose to 50 mg daily as I am concerning of chronic antibiotic use. Patient stated that she sometimes stopped taking doxycycline for 2-3 weeks if her symptoms resolved. She also takes clindamycin topical. Patient denies side effects from using clindamycin  "topical and taking doxycycline orally. She reported that she sometimes notices stomach upset once in a while but not frequently or severely. We discussed to take probiotics daily when she is taking oral antibiotic.         Current medicines (including changes today)  Current Outpatient Prescriptions   Medication Sig Dispense Refill   • doxycycline (VIBRAMYCIN) 50 MG capsule      • lovastatin (MEVACOR) 40 MG tablet Take 1 Tab by mouth every bedtime. 90 Tab 1   • CLINDAMYCIN PHOSPHATE,TOPICAL, 1 % Lotion      • atenolol (TENORMIN) 25 MG Tab Take 1 Tab by mouth 2 times a day. 180 Tab 3   • vitamin D (CHOLECALCIFEROL) 1000 UNIT TABS Take 2,000 Units by mouth every day.       No current facility-administered medications for this visit.      She  has a past medical history of HTN (hypertension); transfusion of packed red blood cells; Hyperlipidemia LDL goal < 100; Vitamin D insufficiency (3/23/2014); and Von Willebrand disease (CMS-[facility]) (dx 1955).    ROS   No chest pain, no shortness of breath, no abdominal pain       Objective:     Blood pressure 134/80, pulse 72, temperature 36.3 °C (97.3 °F), height 1.674 m (5' 5.9\"), weight 73.7 kg (162 lb 6.4 oz), last menstrual period 10/01/1986, SpO2 97 %, not currently breastfeeding. Body mass index is 26.29 kg/m².   Physical Exam:  General: Alert, oriented and no acute distress.  Eye contact is good, speech goal directed, affect calm  HEENT: conjunctiva non-injected, sclera non-icteric.  Oral mucous membranes pink and moist with no lesions.  Pinna normal.   Lungs: Normal respiratory effort, clear to auscultation bilaterally with good excursion.  CV: regular rate and rhythm. No murmurs.   Abdomen: soft, non distended, nontender, Bowel sound normal.  Ext: no edema, color normal, vascularity normal, temperature normal        Assessment and Plan:   The following treatment plan was discussed     1. Vitamin D insufficiency  - Not adequately yet. Patient is advised to increase the dose " of vitamin D3 to 4000 units daily.  - We will recheck vitamin D in 6 months.  - VITAMIN D,25 HYDROXY; Future    2. Hyperlipidemia with target LDL less than 100  - Well-controlled. Continue current regimen, lovastatin 40 mg every evening. Recheck lab 1-2 weeks before next follow up visit.  - Advised to eat low fat, low carbohydrate and high fiber diet as well as do cardio physical exercise regularly.   - COMP METABOLIC PANEL; Future  - LIPID PROFILE; Future    3. Essential hypertension  - Well-controlled. Continue current regimen, atenolol 50 mg daily. Recheck lab 1-2 weeks before next follow up visit.  - Recommend to monitor blood pressure and heart rate at home.  Advised patient to cut down atenolol to 25 mg daily if she has bradycardia with heart rate less than 50 or low blood pressure  - CBC WITH DIFFERENTIAL; Future  - COMP METABOLIC PANEL; Future    4. Von Willebrand disease (CMS-HCC)  - Chronic. No acute bleeding, asymptomatic. Continue to monitor. Advised patient to monitor any signs or symptoms of abnormal bleeding and anemia.  - CBC WITH DIFFERENTIAL; Future  - COMP METABOLIC PANEL; Future    5. Rosacea  - Her dermatologist cut down doxycycline from 100 mg daily to 50 mg daily. She is continuing using clindamycin topical.  - Recommend to take probiotics daily.      Followup: Return in about 6 months (around 8/14/2018), or if symptoms worsen or fail to improve, for hyperlipidemia, hypertension, vitamin D insufficiency, rosacea, lab review.      Please note that this dictation was created using voice recognition software. I have made every reasonable attempt to correct obvious errors, but I expect that there may have unintended errors in text, spelling, punctuation, or grammar that I did not discover.

## 2018-02-14 NOTE — ASSESSMENT & PLAN NOTE
Patient still has low vitamin D at 26 on 2/9/18. She is taking over-the-counter vitamin D 2000 units daily currently. We discussed to increase the dose of vitamin D to 4000 units daily.

## 2018-02-14 NOTE — ASSESSMENT & PLAN NOTE
Patient stated that her dermatologist cut down doxycycline dose to 50 mg daily as I am concerning of chronic antibiotic use. Patient stated that she sometimes stopped taking doxycycline for 2-3 weeks if her symptoms resolved. She also takes clindamycin topical. Patient denies side effects from using clindamycin topical and taking doxycycline orally. She reported that she sometimes notices stomach upset once in a while but not frequently or severely. We discussed to take probiotics daily when she is taking oral antibiotic.

## 2018-03-15 ENCOUNTER — TELEPHONE (OUTPATIENT)
Dept: HEMATOLOGY ONCOLOGY | Facility: MEDICAL CENTER | Age: 83
End: 2018-03-15

## 2018-03-15 ENCOUNTER — TELEPHONE (OUTPATIENT)
Dept: MEDICAL GROUP | Age: 83
End: 2018-03-15

## 2018-03-15 DIAGNOSIS — D68.00 VON WILLEBRAND DISEASE (HCC): ICD-10-CM

## 2018-03-15 NOTE — TELEPHONE ENCOUNTER
Patient's son requests to refer patient to Dr. Kang hematologist for Von Willebrand disease.    Hematologist clinic requested to submit referral.    The referral was placed today.    Thanks!  Zoe Mitchell M.D.

## 2018-03-15 NOTE — TELEPHONE ENCOUNTER
Received a request from the patient's son for Lynne to be see by Dr. Kang for his Von Willebrand Disease.  Sent an in-basket to Zoe Mitchell MD requesting a referral.

## 2018-03-15 NOTE — TELEPHONE ENCOUNTER
----- Message from Zari Bolanos sent at 3/15/2018 10:03 AM PDT -----  Regarding: FW: hematology appointment  Michelle Mitchell,    I received the below request asking for a NP appointment for hematology.  If appropriate could you please submit a referral.  Thank you,  Fazal  NP Coordinator Oncology/Hematology  ----- Message -----  From: Brian Edwards Ass't  Sent: 3/7/2018  11:14 AM  To: Zari Bolanos  Subject: hematology appointment                           Patient's son who is a current patient of Dr. Kang would like the patient to be seen by Dr. Kang for hematology     Dx> Von Willebrand Disease.

## 2018-03-19 ENCOUNTER — TELEPHONE (OUTPATIENT)
Dept: HEMATOLOGY ONCOLOGY | Facility: MEDICAL CENTER | Age: 83
End: 2018-03-19

## 2018-03-19 NOTE — TELEPHONE ENCOUNTER
1st attempt to contact the patient.  No answer, no voicemail.  Mailed a letter to the patient requesting a call back to schedule a new patient hematology appointment.  Ref: Abe Mitchell Dx: Carlin Swartz.

## 2018-04-17 DIAGNOSIS — E78.5 HYPERLIPIDEMIA WITH TARGET LDL LESS THAN 100: ICD-10-CM

## 2018-04-17 RX ORDER — LOVASTATIN 40 MG/1
TABLET ORAL
Qty: 90 TAB | Refills: 3 | Status: SHIPPED | OUTPATIENT
Start: 2018-04-17 | End: 2019-05-02 | Stop reason: SDUPTHER

## 2018-04-18 ENCOUNTER — HOSPITAL ENCOUNTER (OUTPATIENT)
Facility: MEDICAL CENTER | Age: 83
End: 2018-04-18
Attending: INTERNAL MEDICINE
Payer: MEDICARE

## 2018-04-18 ENCOUNTER — OFFICE VISIT (OUTPATIENT)
Dept: HEMATOLOGY ONCOLOGY | Facility: MEDICAL CENTER | Age: 83
End: 2018-04-18
Payer: MEDICARE

## 2018-04-18 ENCOUNTER — NON-PROVIDER VISIT (OUTPATIENT)
Dept: HEMATOLOGY ONCOLOGY | Facility: MEDICAL CENTER | Age: 83
End: 2018-04-18
Payer: MEDICARE

## 2018-04-18 VITALS
HEART RATE: 62 BPM | TEMPERATURE: 98.3 F | BODY MASS INDEX: 26.33 KG/M2 | OXYGEN SATURATION: 96 % | HEIGHT: 66 IN | RESPIRATION RATE: 16 BRPM | WEIGHT: 163.8 LBS | DIASTOLIC BLOOD PRESSURE: 78 MMHG | SYSTOLIC BLOOD PRESSURE: 130 MMHG

## 2018-04-18 DIAGNOSIS — D68.00 VON WILLEBRAND DISEASE (HCC): ICD-10-CM

## 2018-04-18 PROCEDURE — 99204 OFFICE O/P NEW MOD 45 MIN: CPT | Performed by: INTERNAL MEDICINE

## 2018-04-18 PROCEDURE — 85240 CLOT FACTOR VIII AHG 1 STAGE: CPT

## 2018-04-18 PROCEDURE — 85247 CLOT FACTOR VIII MULTIMETRIC: CPT

## 2018-04-18 PROCEDURE — 85245 CLOT FACTOR VIII VW RISTOCTN: CPT

## 2018-04-18 PROCEDURE — 85246 CLOT FACTOR VIII VW ANTIGEN: CPT

## 2018-04-18 PROCEDURE — 36415 COLL VENOUS BLD VENIPUNCTURE: CPT | Performed by: INTERNAL MEDICINE

## 2018-04-18 ASSESSMENT — PAIN SCALES - GENERAL: PAINLEVEL: NO PAIN

## 2018-04-18 NOTE — PROGRESS NOTES
Consult Note: Oncology    Date of consultation: 4/18/2018 9:28 AM    Referring provider: Zoe Mitchell M.D.    Reason for consultation: History of von Willebrand disease    History of presenting illness:      Lynne Dawkins  is a very pleasant 86 y.o. year old female who was diagnosed with type II von Willebrand disease many years ago after she had a recurrent postoperative bleeding complication. I do not have access to all her prior records. She reports having significant postoperative bleeding requiring multiple transfusions. She also has significant family history of von Willebrand's and her son is my patient with type II  disease. Her sister as von Willebrand's disease. 3 uncles bled to death.     She has seen Dr. Jacobson in 1989. She had a factor VIII activity of 21% a Facto VIII antigen of 13% and a Ristocetin cofactor of less than 10. Multimeric analysis was never carried out and so her exact subtype is not known. Her history  include a history of menorrhagia and easy bruising. She underwent spontaneous menopause age 56. Her original diagnosis was made in the 1950s at [facility]. In 1989 she presented needing factor VIII supplement for dental work. At that time, she was given DDAVP and did well. In 1990 she fell from her horse injuring her buttocks. That responded to DDAVP as well. In 1998 we saw her prior to her hysterectomy, and she was treated with Humate P and did well.     She has not had major bleeding episodes over the past few years. She did develop significant epistaxis. Years ago, requiring cauterization and has not had much trouble after that. She was given EACA at that time. However, she has never used this.      Past Medical History:    Past Medical History:   Diagnosis Date   • HTN (hypertension)    • Hx of transfusion of packed red blood cells    • Hyperlipidemia LDL goal < 100    • Vitamin D insufficiency 3/23/2014   • Von Willebrand disease ([facility]) dx 1955        Past surgical history:    Past Surgical History:   Procedure Laterality Date   • CATARACT PHACO WITH IOL  2012    Performed by JEREL MACDONALD at [facility]   • CATARACT PHACO WITH IOL  2012    Performed by JEREL MACDONALD at [facility]   • HYSTERECTOMY, VAGINAL         Allergies:  Aspirin and Iodine    Medications:    Current Outpatient Prescriptions   Medication Sig Dispense Refill   • lovastatin (MEVACOR) 40 MG tablet TAKE ONE TABLET BY MOUTH EVERY NIGHT AT BEDTIME (MEVACOR) 90 Tab 3   • doxycycline (VIBRAMYCIN) 50 MG capsule      • CLINDAMYCIN PHOSPHATE,TOPICAL, 1 % Lotion      • atenolol (TENORMIN) 25 MG Tab Take 1 Tab by mouth 2 times a day. 180 Tab 3   • vitamin D (CHOLECALCIFEROL) 1000 UNIT TABS Take 2,000 Units by mouth every day.       No current facility-administered medications for this visit.        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 Has 6 grandchildren.        Family History:     Family History   Problem Relation Age of Onset   • Cancer Mother      d 33 yo. Unk type.   • Heart Disease Sister      d 69   • Cancer Brother      d 73 brain cancer       Review of Systems:  All other review of systems are negative except what was mentioned above in the HPI.    Constitutional: No fever, chills, weight loss ,malaise/fatigue.    HEENT: No new auditory or visual complaints. No sore throat and neck pain.     Respiratory:No new cough, sputum production,  "shortness of breath and wheezing.    Cardiovascular: No new chest pain, palpitations, orthopnea and leg swelling.    Gastrointestinal: No heartburn, nausea, vomiting ,abdominal pain, hematochezia or melena     Genitourinary: Negative for dysuria, hematuria    Musculoskeletal: No new arthralgias or myalgias   Skin: Negative for rash and itching.    Neurological: Negative for focal weakness or headaches.    Endo/Heme/Allergies: No abnormal bleed/bruise.    Psychiatric/Behavioral: No new depression/anxiety.    Physical Exam:  Vitals:   BP (!) 182/96   Pulse 62   Temp 36.8 °C (98.3 °F)   Resp 16   Ht 1.674 m (5' 5.9\")   Wt 74.3 kg (163 lb 12.8 oz)   LMP 10/01/1986   SpO2 96%   BMI 26.52 kg/m²     General: Not in acute distress, alert and oriented x 3  HEENT: No pallor, icterus. Oropharynx clear.   Neck: Supple, no palpable masses.  Lymph nodes: No palpable cervical, supraclavicular, axillary or inguinal lymphadenopathy.    CVS: regular rate and rhythm, no rubs or gallops  RESP: Clear to auscultate bilaterally, no wheezing or crackles.   ABD: Soft, non tender, non distended, positive bowel sounds, no palpable organomegaly  EXT: No edema or cyanosis  CNS: Alert and oriented x3, No focal deficits.  Skin- No rash      Labs:   No results for input(s): RBC, HEMOGLOBIN, HEMATOCRIT, PLATELETCT, PROTHROMBTM, APTT, INR, IRON, FERRITIN, TOTIRONBC in the last 72 hours.  Lab Results   Component Value Date/Time    SODIUM 140 02/09/2018 11:31 AM    POTASSIUM 3.9 02/09/2018 11:31 AM    CHLORIDE 105 02/09/2018 11:31 AM    CO2 28 02/09/2018 11:31 AM    GLUCOSE 92 02/09/2018 11:31 AM    BUN 13 02/09/2018 11:31 AM    CREATININE 0.77 02/09/2018 11:31 AM    CREATININE 0.80 04/22/2013 08:14 AM    BUNCREATRAT 18 04/22/2013 08:14 AM        Assessment and Plan:    Long-standing history of von Willebrand disease, presumed type II. . She has significant history of von Willebrand disease and had multiple episodes of postoperative bleeding " spanning over the past few decades. She has not had recent episodes of bleeding. She was seen by Dr. Jacobson before and has received prophylactic factor VIII and DDAVP. . Her son is my patient and was diagnosed with type II disease. Unfortunately RIPA testing was unable to be done here. Her daughter had genetic analysis done and was diagnosed with Type II N disease.    Fortunately, she has not had any major bleeding. She was given a prescription for EACA 5 years ago for mucosal bleed, which she never used. She is reluctant to get any refills at this time. I have ordered new baseline von Willebrand profile including multimeric analysis and a ristocetin cofactor assay. Ideally, she will need RIPA testing which is not available in [place]. Her son did not respond to Stimate challenge. Assuming that she also has the same genotype, I would favor her getting Wilate for any perioperative use or any bleeding episodes. She is well aware of monitoring for any bleeding symptoms and to seek immediate care. Encouraged her to keep some EACA tablets handy. However, she is reluctant to do this due to the cost.    Complex patient requiring complex decision making. I have extensively reviewed her prior medical records as part of establishing care with me and formulated the plan.  She agreed and verbalized her agreement and understanding with the current plan.  I answered all questions and concerns she has at this time.              Thank you for allowing me to participate in her care.    Please note that this dictation was created using voice recognition software. I have made every reasonable attempt to correct obvious errors, but I expect that there are errors of grammar and possibly content that I did not discover before finalizing the note.      SIGNATURES:  Neil Kang    CC:  JELANI Fink Natalie L, M.D.

## 2018-04-19 VITALS
BODY MASS INDEX: 26.33 KG/M2 | SYSTOLIC BLOOD PRESSURE: 130 MMHG | TEMPERATURE: 98.3 F | HEIGHT: 66 IN | HEART RATE: 62 BPM | RESPIRATION RATE: 16 BRPM | OXYGEN SATURATION: 96 % | WEIGHT: 163.8 LBS | DIASTOLIC BLOOD PRESSURE: 78 MMHG

## 2018-04-19 ASSESSMENT — PAIN SCALES - GENERAL: PAINLEVEL: NO PAIN

## 2018-04-23 LAB
FACT VIII ACT/NOR PPP: 28 % (ref 56–191)
VWF AG ACT/NOR PPP IA: 18 % (ref 52–214)
VWF MULTIMERS PPP QL: ABNORMAL
VWF:RCO ACT/NOR PPP PL AGG: 14 % (ref 51–215)

## 2018-05-28 NOTE — PROGRESS NOTES
Outcome: Left Message    Please transfer to Patient Outreach Team at [phone] when patient returns call.    WebIZ Checked & Epic Updated:  yes    HealthConnect Verified: yes    Attempt # 1

## 2018-05-29 NOTE — PROGRESS NOTES
Attempt #:1    WebIZ Checked & Epic Updated: yes  HealthConnect Verified: yes  Verify PCP: yes    Communication Preference Obtained: yes     Review Care Team: no    Annual Wellness Visit Scheduling  1. Scheduling Status:Not Scheduled. Patient states they are not interested        Care Gap Scheduling (Attempt to Schedule EACH Overdue Care Gap!)  Health Maintenance Due   Topic Date Due   • Annual Wellness Visit  10/02/2016     [employer] Activation: declined  [employer] Jeanette: no  Virtual Visits: no  Opt In to Text Messages: no

## 2018-07-16 DIAGNOSIS — I10 ESSENTIAL HYPERTENSION: ICD-10-CM

## 2018-07-16 RX ORDER — ATENOLOL 25 MG/1
TABLET ORAL
Qty: 180 TAB | Refills: 3 | Status: SHIPPED | OUTPATIENT
Start: 2018-07-16 | End: 2019-10-15 | Stop reason: SDUPTHER

## 2018-09-14 ENCOUNTER — HOSPITAL ENCOUNTER (OUTPATIENT)
Dept: LAB | Facility: MEDICAL CENTER | Age: 83
End: 2018-09-14
Attending: INTERNAL MEDICINE
Payer: MEDICARE

## 2018-09-14 DIAGNOSIS — I10 ESSENTIAL HYPERTENSION: ICD-10-CM

## 2018-09-14 DIAGNOSIS — E78.5 HYPERLIPIDEMIA WITH TARGET LDL LESS THAN 100: ICD-10-CM

## 2018-09-14 DIAGNOSIS — E55.9 VITAMIN D INSUFFICIENCY: ICD-10-CM

## 2018-09-14 DIAGNOSIS — D68.00 VON WILLEBRAND DISEASE (HCC): ICD-10-CM

## 2018-09-14 LAB
ALBUMIN SERPL BCP-MCNC: 4.1 G/DL (ref 3.2–4.9)
ALBUMIN/GLOB SERPL: 1.8 G/DL
ALP SERPL-CCNC: 81 U/L (ref 30–99)
ALT SERPL-CCNC: 16 U/L (ref 2–50)
ANION GAP SERPL CALC-SCNC: 7 MMOL/L (ref 0–11.9)
AST SERPL-CCNC: 18 U/L (ref 12–45)
BASOPHILS # BLD AUTO: 0.8 % (ref 0–1.8)
BASOPHILS # BLD: 0.05 K/UL (ref 0–0.12)
BILIRUB SERPL-MCNC: 0.6 MG/DL (ref 0.1–1.5)
BUN SERPL-MCNC: 10 MG/DL (ref 8–22)
CALCIUM SERPL-MCNC: 8.9 MG/DL (ref 8.5–10.5)
CHLORIDE SERPL-SCNC: 106 MMOL/L (ref 96–112)
CHOLEST SERPL-MCNC: 198 MG/DL (ref 100–199)
CO2 SERPL-SCNC: 29 MMOL/L (ref 20–33)
CREAT SERPL-MCNC: 0.84 MG/DL (ref 0.5–1.4)
EOSINOPHIL # BLD AUTO: 0.22 K/UL (ref 0–0.51)
EOSINOPHIL NFR BLD: 3.5 % (ref 0–6.9)
ERYTHROCYTE [DISTWIDTH] IN BLOOD BY AUTOMATED COUNT: 44.6 FL (ref 35.9–50)
GLOBULIN SER CALC-MCNC: 2.3 G/DL (ref 1.9–3.5)
GLUCOSE SERPL-MCNC: 79 MG/DL (ref 65–99)
HCT VFR BLD AUTO: 40.1 % (ref 37–47)
HDLC SERPL-MCNC: 93 MG/DL
HGB BLD-MCNC: 13.2 G/DL (ref 12–16)
IMM GRANULOCYTES # BLD AUTO: 0.02 K/UL (ref 0–0.11)
IMM GRANULOCYTES NFR BLD AUTO: 0.3 % (ref 0–0.9)
LDLC SERPL CALC-MCNC: 86 MG/DL
LYMPHOCYTES # BLD AUTO: 1.62 K/UL (ref 1–4.8)
LYMPHOCYTES NFR BLD: 25.7 % (ref 22–41)
MCH RBC QN AUTO: 30.8 PG (ref 27–33)
MCHC RBC AUTO-ENTMCNC: 32.9 G/DL (ref 33.6–35)
MCV RBC AUTO: 93.7 FL (ref 81.4–97.8)
MONOCYTES # BLD AUTO: 0.74 K/UL (ref 0–0.85)
MONOCYTES NFR BLD AUTO: 11.7 % (ref 0–13.4)
NEUTROPHILS # BLD AUTO: 3.65 K/UL (ref 2–7.15)
NEUTROPHILS NFR BLD: 58 % (ref 44–72)
NRBC # BLD AUTO: 0 K/UL
NRBC BLD-RTO: 0 /100 WBC
PLATELET # BLD AUTO: 298 K/UL (ref 164–446)
PMV BLD AUTO: 10 FL (ref 9–12.9)
POTASSIUM SERPL-SCNC: 3.5 MMOL/L (ref 3.6–5.5)
PROT SERPL-MCNC: 6.4 G/DL (ref 6–8.2)
RBC # BLD AUTO: 4.28 M/UL (ref 4.2–5.4)
SODIUM SERPL-SCNC: 142 MMOL/L (ref 135–145)
TRIGL SERPL-MCNC: 95 MG/DL (ref 0–149)
WBC # BLD AUTO: 6.3 K/UL (ref 4.8–10.8)

## 2018-09-14 PROCEDURE — 80061 LIPID PANEL: CPT

## 2018-09-14 PROCEDURE — 82306 VITAMIN D 25 HYDROXY: CPT

## 2018-09-14 PROCEDURE — 36415 COLL VENOUS BLD VENIPUNCTURE: CPT

## 2018-09-14 PROCEDURE — 85025 COMPLETE CBC W/AUTO DIFF WBC: CPT

## 2018-09-14 PROCEDURE — 80053 COMPREHEN METABOLIC PANEL: CPT

## 2018-09-15 LAB — 25(OH)D3 SERPL-MCNC: 32 NG/ML (ref 30–100)

## 2018-09-28 ENCOUNTER — TELEPHONE (OUTPATIENT)
Dept: MEDICAL GROUP | Age: 83
End: 2018-09-28

## 2018-09-28 NOTE — TELEPHONE ENCOUNTER
ESTABLISHED PATIENT PRE-VISIT PLANNING     Note: Patient will not be contacted if there is no indication to call.     1.  Reviewed notes from the last few office visits within the medical group: Yes    2.  If any orders were placed at last visit or intended to be done for this visit (i.e. 6 mos follow-up), do we have Results/Consult Notes?        •  Labs - Labs ordered, completed on 9/24/18 and results are in chart.   Note: If patient appointment is for lab review and patient did not complete labs, check with provider if OK to reschedule patient until labs completed.       •  Imaging - Imaging was not ordered at last office visit.       •  Referrals - No referrals were ordered at last office visit.    3. Is this appointment scheduled as a Hospital Follow-Up? No    4.  Immunizations were updated in Epic using WebIZ?: Epic matches WebIZ       •  Web Iz Recommendations: Patient is up to date on all vaccines    5.  Patient is due for the following Health Maintenance Topics:   Health Maintenance Due   Topic Date Due   • IMM ZOSTER VACCINES (2 of 3) 01/31/2011   • Annual Wellness Visit  10/02/2016   • IMM INFLUENZA (1) 09/01/2018       - Patient is up-to-date on all Health Maintenance topics. No records have been requested at this time.    6.  MDX printed for Provider? YES    7.  Patient was NOT informed to arrive 15 min prior to their scheduled appointment and bring in their medication bottles.

## 2018-10-01 ENCOUNTER — OFFICE VISIT (OUTPATIENT)
Dept: MEDICAL GROUP | Age: 83
End: 2018-10-01
Payer: MEDICARE

## 2018-10-01 VITALS
WEIGHT: 159.4 LBS | TEMPERATURE: 98.6 F | BODY MASS INDEX: 25.62 KG/M2 | OXYGEN SATURATION: 92 % | DIASTOLIC BLOOD PRESSURE: 72 MMHG | SYSTOLIC BLOOD PRESSURE: 128 MMHG | HEART RATE: 72 BPM | HEIGHT: 66 IN

## 2018-10-01 DIAGNOSIS — E55.9 VITAMIN D INSUFFICIENCY: ICD-10-CM

## 2018-10-01 DIAGNOSIS — E78.5 HYPERLIPIDEMIA WITH TARGET LDL LESS THAN 100: ICD-10-CM

## 2018-10-01 DIAGNOSIS — Z23 NEEDS FLU SHOT: ICD-10-CM

## 2018-10-01 DIAGNOSIS — D68.00 VON WILLEBRAND DISEASE (HCC): ICD-10-CM

## 2018-10-01 DIAGNOSIS — I10 ESSENTIAL HYPERTENSION: ICD-10-CM

## 2018-10-01 PROCEDURE — 90662 IIV NO PRSV INCREASED AG IM: CPT | Performed by: INTERNAL MEDICINE

## 2018-10-01 PROCEDURE — 99214 OFFICE O/P EST MOD 30 MIN: CPT | Mod: 25 | Performed by: INTERNAL MEDICINE

## 2018-10-01 PROCEDURE — G0008 ADMIN INFLUENZA VIRUS VAC: HCPCS | Performed by: INTERNAL MEDICINE

## 2018-10-02 NOTE — ASSESSMENT & PLAN NOTE
Patient followed with hematologist-oncologist, Dr. Kang.  She did not have any active bleeding.  She has low factor VIII and von Willebrand factor activity test on 4/18/18.  She does not have any anemic on CBC on 9/14/18.

## 2018-10-02 NOTE — ASSESSMENT & PLAN NOTE
Patient is taking atenolol 25 mg twice a day.  Her blood pressure is stable and well controlled.  She denied dizziness or fatigue or tiredness or slow heartbeat from taking atenolol.

## 2018-10-02 NOTE — ASSESSMENT & PLAN NOTE
Her vitamin D level improved after increasing the dose of vitamin D to 2000 units daily.  She denies side effects from taking vitamin D supplement.    Results for NORMA DHALIWAL KENTRELL (MRN [id number]) as of 10/1/2018 19:20   Ref. Range 2/9/2018 11:31 9/14/2018 07:04   25-Hydroxy   Vitamin D 25 Latest Ref Range: 30 - 100 ng/mL 26 (L) 32

## 2018-10-02 NOTE — PROGRESS NOTES
Subjective:   Norma Dawkins is a 87 y.o. female here today for evaluation and management of:      Von Willebrand disease  Patient followed with hematologist-oncologist, Dr. Kang.  She did not have any active bleeding.  She has low factor VIII and von Willebrand factor activity test on 4/18/18.  She does not have any anemic on CBC on 9/14/18.    Vitamin D insufficiency  Her vitamin D level improved after increasing the dose of vitamin D to 2000 units daily.  She denies side effects from taking vitamin D supplement.    Results for NORMA DAWKINS (MRN [id number]) as of 10/1/2018 19:20   Ref. Range 2/9/2018 11:31 9/14/2018 07:04   25-Hydroxy   Vitamin D 25 Latest Ref Range: 30 - 100 ng/mL 26 (L) 32       Hyperlipidemia with target LDL less than 100  She is taking lovastatin 40 mg every evening.  Her cholesterol is stable and well controlled.  She does not have side effects from taking lovastatin.  She has normal liver enzymes on her recent blood test on 9/14/18.    Results for NORMA DAWKINS (MRN [id number]) as of 10/1/2018 19:20   Ref. Range 9/14/2018 07:04   Cholesterol,Tot Latest Ref Range: 100 - 199 mg/dL 198   Triglycerides Latest Ref Range: 0 - 149 mg/dL 95   HDL Latest Ref Range: >=40 mg/dL 93   LDL Latest Ref Range: <100 mg/dL 86       Essential hypertension  Patient is taking atenolol 25 mg twice a day.  Her blood pressure is stable and well controlled.  She denied dizziness or fatigue or tiredness or slow heartbeat from taking atenolol.         Current medicines (including changes today)  Current Outpatient Prescriptions   Medication Sig Dispense Refill   • atenolol (TENORMIN) 25 MG Tab TAKE ONE TABLET BY MOUTH TWICE A  Tab 3   • lovastatin (MEVACOR) 40 MG tablet TAKE ONE TABLET BY MOUTH EVERY NIGHT AT BEDTIME (MEVACOR) 90 Tab 3   • doxycycline (VIBRAMYCIN) 50 MG capsule      • CLINDAMYCIN PHOSPHATE,TOPICAL, 1 % Lotion      • vitamin D (CHOLECALCIFEROL) 1000 UNIT TABS Take 2,000 Units by  "mouth every day.       No current facility-administered medications for this visit.      She  has a past medical history of HTN (hypertension); transfusion of packed red blood cells; Hyperlipidemia LDL goal < 100; Vitamin D insufficiency (3/23/2014); and Von Willebrand disease (HCC) (dx 1955).    ROS   No chest pain, no shortness of breath, no abdominal pain       Objective:     Blood pressure 128/72, pulse 72, temperature 37 °C (98.6 °F), temperature source Temporal, height 1.674 m (5' 5.9\"), weight 72.3 kg (159 lb 6.4 oz), last menstrual period 10/01/1986, SpO2 92 %, not currently breastfeeding. Body mass index is 25.81 kg/m².   Physical Exam:  General: Alert, oriented and no acute distress.  Eye contact is good, speech goal directed, affect calm  HEENT: conjunctiva non-injected, sclera non-icteric.  Oral mucous membranes pink and moist with no lesions.  Pinna normal.  Lungs: Normal respiratory effort, clear to auscultation bilaterally with good excursion.  CV: regular rate and rhythm. No murmurs.   Abdomen: soft, non distended, nontender, Bowel sound normal.  Ext: no edema, color normal, vascularity normal, temperature normal        Assessment and Plan:   The following treatment plan was discussed     1. Essential hypertension  - Well-controlled. Continue current regimens, atenolol 25 mg 2 tablets a day. Recheck lab 1-2 weeks before next follow up visit.  - Reviewed the risks and benefits as well as potential side effects of medications with patient.  - Discussed to eat low-sodium diet and encouraged to do regular physical exercise.  - Recommend to monitor blood pressure and heart rate at home.  - COMP METABOLIC PANEL; Future    2. Hyperlipidemia with target LDL less than 100  - Well-controlled. Continue current regimens, lovastatin 40 mg every evening. Recheck lab 1-2 weeks before next follow up visit.  - Reviewed the risks and benefits of treatment and potential side effects of medication.  - Advised to eat low " fat, low carbohydrate and high fiber diet as well as do cardio physical exercise regularly.  - COMP METABOLIC PANEL; Future  - LIPID PROFILE; Future    3. Vitamin D insufficiency  - Well-controlled. Continue current regimens. Recheck lab 1-2 weeks before next follow up visit.  - VITAMIN D,25 HYDROXY; Future    4. Von Willebrand disease (HCC)  - Chronic and stable.  No signs of active bleeding.  Continue to monitor.  Patient has follow-up appointment with hematologist-oncologist in April 2019.    5. Needs flu shot  - Influenza vaccine was given today after reviewing risks and benefits as well as side effects of vaccine.  - INFLUENZA VACCINE, HIGH DOSE (65+ ONLY)          Followup: Return in about 6 months (around 4/1/2019), or if symptoms worsen or fail to improve, for Hyperlipidemia, Hypertension, Vitamin D insufficiency, Lab review.      Please note that this dictation was created using voice recognition software. I have made every reasonable attempt to correct obvious errors, but I expect that there may have unintended errors in text, spelling, punctuation, or grammar that I did not discover.

## 2018-10-17 ENCOUNTER — PATIENT OUTREACH (OUTPATIENT)
Dept: HEALTH INFORMATION MANAGEMENT | Facility: OTHER | Age: 83
End: 2018-10-17

## 2018-10-17 NOTE — PROGRESS NOTES
Outcome: Left Message    Please transfer to Patient Outreach Team at [phone] when patient returns call.    WebIZ Checked & Epic Updated:  yes  MMR   Td (adult), adsorbed   Zoster Stefano (Shingrix)     HealthConnect Verified: yes  Effective Date: 1/1/2018     Attempt # 1

## 2018-10-31 NOTE — PROGRESS NOTES
Outcome: Left Message    Please transfer to Patient Outreach Team at [phone] when patient returns call.    Attempt # 2

## 2018-11-07 NOTE — PROGRESS NOTES
Outcome: declined to schedule any future awv added to the do not call list    Please transfer to Patient Outreach Team at [phone] when patient returns call.    Attempt # 3

## 2019-03-26 ENCOUNTER — HOSPITAL ENCOUNTER (OUTPATIENT)
Dept: LAB | Facility: MEDICAL CENTER | Age: 84
End: 2019-03-26
Attending: INTERNAL MEDICINE
Payer: MEDICARE

## 2019-03-26 DIAGNOSIS — I10 ESSENTIAL HYPERTENSION: ICD-10-CM

## 2019-03-26 DIAGNOSIS — E78.5 HYPERLIPIDEMIA WITH TARGET LDL LESS THAN 100: ICD-10-CM

## 2019-03-26 DIAGNOSIS — E55.9 VITAMIN D INSUFFICIENCY: ICD-10-CM

## 2019-03-26 LAB
25(OH)D3 SERPL-MCNC: 37 NG/ML (ref 30–100)
ALBUMIN SERPL BCP-MCNC: 4.4 G/DL (ref 3.2–4.9)
ALBUMIN/GLOB SERPL: 1.6 G/DL
ALP SERPL-CCNC: 89 U/L (ref 30–99)
ALT SERPL-CCNC: 16 U/L (ref 2–50)
ANION GAP SERPL CALC-SCNC: 5 MMOL/L (ref 0–11.9)
AST SERPL-CCNC: 18 U/L (ref 12–45)
BILIRUB SERPL-MCNC: 0.5 MG/DL (ref 0.1–1.5)
BUN SERPL-MCNC: 12 MG/DL (ref 8–22)
CALCIUM SERPL-MCNC: 9.1 MG/DL (ref 8.5–10.5)
CHLORIDE SERPL-SCNC: 106 MMOL/L (ref 96–112)
CHOLEST SERPL-MCNC: 213 MG/DL (ref 100–199)
CO2 SERPL-SCNC: 31 MMOL/L (ref 20–33)
CREAT SERPL-MCNC: 0.85 MG/DL (ref 0.5–1.4)
FASTING STATUS PATIENT QL REPORTED: NORMAL
GLOBULIN SER CALC-MCNC: 2.7 G/DL (ref 1.9–3.5)
GLUCOSE SERPL-MCNC: 86 MG/DL (ref 65–99)
HDLC SERPL-MCNC: 95 MG/DL
LDLC SERPL CALC-MCNC: 92 MG/DL
POTASSIUM SERPL-SCNC: 3.9 MMOL/L (ref 3.6–5.5)
PROT SERPL-MCNC: 7.1 G/DL (ref 6–8.2)
SODIUM SERPL-SCNC: 142 MMOL/L (ref 135–145)
TRIGL SERPL-MCNC: 128 MG/DL (ref 0–149)

## 2019-03-26 PROCEDURE — 80053 COMPREHEN METABOLIC PANEL: CPT

## 2019-03-26 PROCEDURE — 82306 VITAMIN D 25 HYDROXY: CPT

## 2019-03-26 PROCEDURE — 80061 LIPID PANEL: CPT

## 2019-03-26 PROCEDURE — 36415 COLL VENOUS BLD VENIPUNCTURE: CPT

## 2019-04-02 ENCOUNTER — TELEPHONE (OUTPATIENT)
Dept: MEDICAL GROUP | Age: 84
End: 2019-04-02

## 2019-04-02 NOTE — TELEPHONE ENCOUNTER
ESTABLISHED PATIENT PRE-VISIT PLANNING     Patient was contacted to complete PVP.     Note: Patient will not be contacted if there is no indication to call.     1.  Reviewed notes from the last few office visits within the medical group: Yes    2.  If any orders were placed at last visit or intended to be done for this visit (i.e. 6 mos follow-up), do we have Results/Consult Notes?        •  Labs - Labs ordered, completed on 3/26/19 and results are in chart.   Note: If patient appointment is for lab review and patient did not complete labs, check with provider if OK to reschedule patient until labs completed.       •  Imaging - Imaging was not ordered at last office visit.       •  Referrals - No referrals were ordered at last office visit.    3. Is this appointment scheduled as a Hospital Follow-Up? No    4.  Immunizations were updated in Epic using WebIZ?: Epic matches WebIZ       •  Web Iz Recommendations: SHINGRIX (Shingles)    5.  Patient is due for the following Health Maintenance Topics:   Health Maintenance Due   Topic Date Due   • IMM ZOSTER VACCINES (2 of 3) 01/31/2011   • Annual Wellness Visit  10/02/2016       - Patient is up-to-date on all Health Maintenance topics. No records have been requested at this time.    6. Orders for overdue Health Maintenance topics pended in Pre-Charting? N\A    7.  AHA (MDX) form printed for Provider? YES    8.  Patient was NOT informed to arrive 15 min prior to their scheduled appointment and bring in their medication bottles.

## 2019-04-11 ENCOUNTER — OFFICE VISIT (OUTPATIENT)
Dept: MEDICAL GROUP | Age: 84
End: 2019-04-11
Payer: MEDICARE

## 2019-04-11 VITALS
DIASTOLIC BLOOD PRESSURE: 64 MMHG | BODY MASS INDEX: 25.58 KG/M2 | OXYGEN SATURATION: 95 % | HEIGHT: 66 IN | WEIGHT: 159.2 LBS | TEMPERATURE: 98.5 F | SYSTOLIC BLOOD PRESSURE: 132 MMHG | HEART RATE: 65 BPM

## 2019-04-11 DIAGNOSIS — E55.9 VITAMIN D INSUFFICIENCY: ICD-10-CM

## 2019-04-11 DIAGNOSIS — D68.00 VON WILLEBRAND DISEASE (HCC): ICD-10-CM

## 2019-04-11 DIAGNOSIS — I10 ESSENTIAL HYPERTENSION: ICD-10-CM

## 2019-04-11 DIAGNOSIS — E78.5 HYPERLIPIDEMIA WITH TARGET LDL LESS THAN 100: ICD-10-CM

## 2019-04-11 PROCEDURE — 8041 PR SCP AHA: Performed by: INTERNAL MEDICINE

## 2019-04-11 PROCEDURE — 99214 OFFICE O/P EST MOD 30 MIN: CPT | Performed by: INTERNAL MEDICINE

## 2019-04-11 ASSESSMENT — PATIENT HEALTH QUESTIONNAIRE - PHQ9: CLINICAL INTERPRETATION OF PHQ2 SCORE: 0

## 2019-04-11 ASSESSMENT — ACTIVITIES OF DAILY LIVING (ADL): BATHING_REQUIRES_ASSISTANCE: 0

## 2019-04-11 ASSESSMENT — PAIN SCALES - GENERAL: PAINLEVEL: 5=MODERATE PAIN

## 2019-04-11 ASSESSMENT — ENCOUNTER SYMPTOMS: GENERAL WELL-BEING: GOOD

## 2019-04-11 NOTE — PROGRESS NOTES
Annual Health Assessment Questions:    1.  Are you currently engaging in any exercise or physical activity? Yes    2.  How would you describe your mood or emotional well-being today? good    3.  Have you had any falls in the last year? No    4.  Have you noticed any problems with your balance or had difficulty walking? NO    5.  In the last six months have you experienced any leakage of urine? Yes    6. DPA/Advanced Directive: Patient has Living Will, but it is not on file. Instructed to bring in a copy to scan into their chart.

## 2019-04-11 NOTE — PROGRESS NOTES
Subjective:   Norma Dawkins is a 87 y.o. female here today for evaluation and management of:    Von Willebrand disease (HCC)  Chronic history and stable. She does not take medication for this and has had no active bleeding. She will continue to monitor this and follow with her hematologist-oncologist, Dr. Kang.    Hyperlipidemia with target LDL less than 100  Stable and well-controlled. Patient is taking Lovastatin 40 mg every evening without side effects. She has normal liver enzymes and kidney function. I discussed the blood test with the patient in clinic today showing that her total cholesterol is elevated at 213. Her triglycerides have increased from 95 to 128 and still in the normal range. She will continue her same dose and continue to keep a healthy diet and exercise.     Results for NORMA DAWKINS (MRN [id number]) as of 4/11/2019 16:39   Ref. Range 9/14/2018 07:04 3/26/2019 07:11   Cholesterol,Tot Latest Ref Range: 100 - 199 mg/dL 198 213 (H)   Triglycerides Latest Ref Range: 0 - 149 mg/dL 95 128   HDL Latest Ref Range: >=40 mg/dL 93 95   LDL Latest Ref Range: <100 mg/dL 86 92     Vitamin D insufficiency  Stable and well-controlled. Patient is taking Vitamin D 2000 units daily. She denies any side effects from taking vitamin D supplement. Labs were discussed with the patient showing that her potassium has improved to 37, it was low before.     Results for NORMA DAWKINS (MRN [id number]) as of 4/11/2019 16:39   Ref. Range 9/14/2018 07:04 3/26/2019 07:11   25-Hydroxy   Vitamin D 25 Latest Ref Range: 30 - 100 ng/mL 32 37     Essential hypertension  Stable and well-controlled with atenolol 25 MG which the patient is taking twice a day without side effects. She is monitoring her blood pressure at home with normal readings. Patient is walking regularly. Denies dizziness, fatigue, or slow heart beat.      Current medicines (including changes today)  Current Outpatient Prescriptions   Medication Sig  "Dispense Refill   • atenolol (TENORMIN) 25 MG Tab TAKE ONE TABLET BY MOUTH TWICE A  Tab 3   • lovastatin (MEVACOR) 40 MG tablet TAKE ONE TABLET BY MOUTH EVERY NIGHT AT BEDTIME (MEVACOR) 90 Tab 3   • doxycycline (VIBRAMYCIN) 50 MG capsule      • CLINDAMYCIN PHOSPHATE,TOPICAL, 1 % Lotion      • vitamin D (CHOLECALCIFEROL) 1000 UNIT TABS Take 2,000 Units by mouth every day.       No current facility-administered medications for this visit.      She  has a past medical history of HTN (hypertension); transfusion of packed red blood cells; Hyperlipidemia LDL goal < 100; Vitamin D insufficiency (3/23/2014); and Von Willebrand disease (HCC) (dx 1955).    ROS   No chest pain, no shortness of breath, no abdominal pain  No dizziness, slow heart beat, or fatigue     Objective:     /64 (BP Location: Right arm, Patient Position: Sitting, BP Cuff Size: Adult)   Pulse 65   Temp 36.9 °C (98.5 °F) (Temporal)   Ht 1.674 m (5' 5.9\")   Wt 72.2 kg (159 lb 3.2 oz)   SpO2 95%  Body mass index is 25.77 kg/m².     Physical Exam:  General: Alert, oriented and no acute distress.  Eye contact is good, speech goal directed, affect calm  HEENT: conjunctiva non-injected, sclera non-icteric.  Oral mucous membranes pink and moist with no lesions.  Pinna normal.  Lungs: Normal respiratory effort, clear to auscultation bilaterally with good excursion.  CV: regular rate and rhythm. No murmurs.   Abdomen: soft, non distended, nontender, Bowel sound normal.  Ext: no edema, color normal, vascularity normal, temperature normal    Annual Health Assessment Questions:     1.  Are you currently engaging in any exercise or physical activity? Yes     2.  How would you describe your mood or emotional well-being today? good     3.  Have you had any falls in the last year? No     4.  Have you noticed any problems with your balance or had difficulty walking? NO     5.  In the last six months have you experienced any leakage of urine? Yes     6. " DPA/Advanced Directive: Patient has Living Will, but it is not on file. Instructed to bring in a copy to scan into their chart.    Assessment and Plan:   The following treatment plan was discussed; follow up in 6 months.    1. Von Willebrand disease (HCC)  - Well-controlled. Stable and follow with hematologist, Dr. Kang, regularly. Recheck lab 1-2 weeks before next follow up visit.  - CBC WITH DIFFERENTIAL; Future  - Comp Metabolic Panel; Future    2. Hyperlipidemia with target LDL less than 100  - Well-controlled. Total cholesterol is mildly elevated but she does not need to have her dose increased or change to a stronger statin. She will continue her current regimen of Lovastatin 40 MG once daily. Recheck lab 1-2 weeks before next follow up visit.  - Reviewed the risks and benefits of treatment and potential side effects of medication.  - Advised to eat low fat, low carbohydrate and high fiber diet as well as do cardio physical exercise regularly. Patient will continue to eat a healthy diet and exercise to maintain normal cholesterol.  - Comp Metabolic Panel; Future  - Lipid Profile; Future    3. Vitamin D insufficiency  - Well-controlled. Continue current regimens of vitamin D 2000 units daily. Recheck lab 1-2 weeks before next follow up visit.  - VITAMIN D,25 HYDROXY; Future    4. Essential hypertension  - Well-controlled. Continue current regimens of atenolol 25 mg twice a day. Recheck lab 1-2 weeks before next follow up visit.  - Reviewed the risks and benefits as well as potential side effects of medications with patient.  - Discussed to eat low-sodium diet and encouraged to do regular physical exercise.  - Recommend to monitor blood pressure and heart rate at home.    5. Health Maintenance   - Patient is due for the Shingrex vaccine.  We could not provide Shingrix vaccine to patient due to nationwide shortage.      Discussed advanced directives.    AHA and MDX form are reviewed and completed in clinic  today.  Appropriate discussion and counseling are provided based on annual health assessment questions.        Followup: Return in about 6 months (around 10/11/2019), or if symptoms worsen or fail to improve, for Hyperlipidemia, Hypertension, Vitamin D insufficiency, Lab review.      Please note that this dictation was created using voice recognition software. I have made every reasonable attempt to correct obvious errors, but I expect that there may have unintended errors in text, spelling, punctuation, or grammar that I did not discover.    I, Neena Clark (Paulie), am scribing for, and in the presence of, Zoe Mitchell M.D..    Electronically signed by: Neena Clark (Paulie), 4/11/2019    I, Zoe Mitchell M.D., personally performed the services described in this documentation, as scribed by Neena Clark in my presence, and it is both accurate and complete.

## 2019-04-15 ENCOUNTER — TELEPHONE (OUTPATIENT)
Dept: HEMATOLOGY ONCOLOGY | Facility: MEDICAL CENTER | Age: 84
End: 2019-04-15

## 2019-04-15 NOTE — TELEPHONE ENCOUNTER
Called patient to reschedule her appointment with Dr. Kang. She informed me she would like to see Dr. Kang when he returns to [facility]. Provided patient the number and info on how to go about doing that.

## 2019-04-18 ENCOUNTER — APPOINTMENT (OUTPATIENT)
Dept: HEMATOLOGY ONCOLOGY | Facility: MEDICAL CENTER | Age: 84
End: 2019-04-18
Payer: MEDICARE

## 2019-05-02 DIAGNOSIS — E78.5 HYPERLIPIDEMIA WITH TARGET LDL LESS THAN 100: ICD-10-CM

## 2019-05-02 RX ORDER — LOVASTATIN 40 MG/1
TABLET ORAL
Qty: 90 TAB | Refills: 3 | Status: SHIPPED | OUTPATIENT
Start: 2019-05-02 | End: 2019-05-22

## 2019-05-19 ENCOUNTER — APPOINTMENT (OUTPATIENT)
Dept: RADIOLOGY | Facility: MEDICAL CENTER | Age: 84
End: 2019-05-19
Attending: EMERGENCY MEDICINE
Payer: MEDICARE

## 2019-05-19 ENCOUNTER — HOSPITAL ENCOUNTER (EMERGENCY)
Facility: MEDICAL CENTER | Age: 84
End: 2019-05-19
Attending: EMERGENCY MEDICINE
Payer: MEDICARE

## 2019-05-19 VITALS
RESPIRATION RATE: 18 BRPM | HEART RATE: 71 BPM | TEMPERATURE: 98.4 F | WEIGHT: 157.41 LBS | HEIGHT: 67 IN | SYSTOLIC BLOOD PRESSURE: 208 MMHG | DIASTOLIC BLOOD PRESSURE: 102 MMHG | OXYGEN SATURATION: 97 % | BODY MASS INDEX: 24.71 KG/M2

## 2019-05-19 DIAGNOSIS — R51.9 ACUTE NONINTRACTABLE HEADACHE, UNSPECIFIED HEADACHE TYPE: ICD-10-CM

## 2019-05-19 DIAGNOSIS — R55 SYNCOPE, UNSPECIFIED SYNCOPE TYPE: ICD-10-CM

## 2019-05-19 LAB
ALBUMIN SERPL BCP-MCNC: 4.4 G/DL (ref 3.2–4.9)
ALBUMIN/GLOB SERPL: 1.6 G/DL
ALP SERPL-CCNC: 98 U/L (ref 30–99)
ALT SERPL-CCNC: 17 U/L (ref 2–50)
ANION GAP SERPL CALC-SCNC: 8 MMOL/L (ref 0–11.9)
AST SERPL-CCNC: 20 U/L (ref 12–45)
BILIRUB SERPL-MCNC: 0.4 MG/DL (ref 0.1–1.5)
BUN SERPL-MCNC: 15 MG/DL (ref 8–22)
CALCIUM SERPL-MCNC: 9.2 MG/DL (ref 8.4–10.2)
CHLORIDE SERPL-SCNC: 101 MMOL/L (ref 96–112)
CO2 SERPL-SCNC: 28 MMOL/L (ref 20–33)
CREAT SERPL-MCNC: 0.78 MG/DL (ref 0.5–1.4)
EKG IMPRESSION: NORMAL
GLOBULIN SER CALC-MCNC: 2.8 G/DL (ref 1.9–3.5)
GLUCOSE SERPL-MCNC: 106 MG/DL (ref 65–99)
POTASSIUM SERPL-SCNC: 3.5 MMOL/L (ref 3.6–5.5)
PROT SERPL-MCNC: 7.2 G/DL (ref 6–8.2)
SODIUM SERPL-SCNC: 137 MMOL/L (ref 135–145)
TROPONIN I SERPL-MCNC: <0.02 NG/ML (ref 0–0.04)

## 2019-05-19 PROCEDURE — 80053 COMPREHEN METABOLIC PANEL: CPT

## 2019-05-19 PROCEDURE — 93005 ELECTROCARDIOGRAM TRACING: CPT | Performed by: EMERGENCY MEDICINE

## 2019-05-19 PROCEDURE — 99284 EMERGENCY DEPT VISIT MOD MDM: CPT

## 2019-05-19 PROCEDURE — 93005 ELECTROCARDIOGRAM TRACING: CPT

## 2019-05-19 PROCEDURE — 85025 COMPLETE CBC W/AUTO DIFF WBC: CPT

## 2019-05-19 PROCEDURE — 70450 CT HEAD/BRAIN W/O DYE: CPT

## 2019-05-19 PROCEDURE — 36415 COLL VENOUS BLD VENIPUNCTURE: CPT

## 2019-05-19 PROCEDURE — 84484 ASSAY OF TROPONIN QUANT: CPT

## 2019-05-19 ASSESSMENT — LIFESTYLE VARIABLES: DO YOU DRINK ALCOHOL: NO

## 2019-05-20 LAB
BASOPHILS # BLD AUTO: 0.7 % (ref 0–1.8)
BASOPHILS # BLD: 0.05 K/UL (ref 0–0.12)
EOSINOPHIL # BLD AUTO: 0.21 K/UL (ref 0–0.51)
EOSINOPHIL NFR BLD: 2.9 % (ref 0–6.9)
ERYTHROCYTE [DISTWIDTH] IN BLOOD BY AUTOMATED COUNT: 42.4 FL (ref 35.9–50)
HCT VFR BLD AUTO: 42.6 % (ref 37–47)
HGB BLD-MCNC: 14 G/DL (ref 12–16)
IMM GRANULOCYTES # BLD AUTO: 0.03 K/UL (ref 0–0.11)
IMM GRANULOCYTES NFR BLD AUTO: 0.4 % (ref 0–0.9)
LYMPHOCYTES # BLD AUTO: 2.13 K/UL (ref 1–4.8)
LYMPHOCYTES NFR BLD: 29.8 % (ref 22–41)
MCH RBC QN AUTO: 29.9 PG (ref 27–33)
MCHC RBC AUTO-ENTMCNC: 32.9 G/DL (ref 33.6–35)
MCV RBC AUTO: 90.8 FL (ref 81.4–97.8)
MONOCYTES # BLD AUTO: 0.68 K/UL (ref 0–0.85)
MONOCYTES NFR BLD AUTO: 9.5 % (ref 0–13.4)
NEUTROPHILS # BLD AUTO: 4.05 K/UL (ref 2–7.15)
NEUTROPHILS NFR BLD: 56.7 % (ref 44–72)
NRBC # BLD AUTO: 0 K/UL
NRBC BLD-RTO: 0 /100 WBC
PLATELET # BLD AUTO: 326 K/UL (ref 164–446)
PMV BLD AUTO: 10.1 FL (ref 9–12.9)
RBC # BLD AUTO: 4.69 M/UL (ref 4.2–5.4)
WBC # BLD AUTO: 7.2 K/UL (ref 4.8–10.8)

## 2019-05-20 ASSESSMENT — ENCOUNTER SYMPTOMS
CHILLS: 0
DIZZINESS: 0
NAUSEA: 0
NECK PAIN: 0
SPEECH CHANGE: 0
FEVER: 0
ABDOMINAL PAIN: 0
VOMITING: 0
WHEEZING: 0
FOCAL WEAKNESS: 0
BACK PAIN: 0
SEIZURES: 0
COUGH: 0

## 2019-05-20 NOTE — ED PROVIDER NOTES
"ED Provider Note     5/19/2019  7:32 PM    Means of Arrival: Walk In  History obtained by: patient  Limitations: none    CHIEF COMPLAINT  Chief Complaint   Patient presents with   • Syncope     \" i had two epidoses of diarrhea last night and passed out twice, i was concerned because i have a clotting disorder \"  denies blood in the stool.        HPI  Lynne Dawkins is a 87 y.o. female with history of von Willebrand's disease, hypertension who presents with concerns of 2 syncopal episodes yesterday.  She has a slight right-sided headache.  She says she has had similar headaches in the past.  Yesterday after having what she described as a large amount of diarrhea, she lost consciousness after standing up from the toilet.  Shortly after that while walking she felt hot lightheaded and then lost consciousness again.  These episodes were witnessed and she was not seem to strike her head.    REVIEW OF SYSTEMS  Review of Systems   Constitutional: Negative for chills and fever.   Respiratory: Negative for cough and wheezing.    Cardiovascular: Negative for chest pain.   Gastrointestinal: Negative for abdominal pain, nausea and vomiting.   Genitourinary: Negative for dysuria.   Musculoskeletal: Negative for back pain and neck pain.   Neurological: Negative for dizziness, speech change, focal weakness and seizures.   All other systems reviewed and are negative.    See HPI for further details.    PAST MEDICAL HISTORY   has a past medical history of HTN (hypertension); transfusion of packed red blood cells; Hyperlipidemia LDL goal < 100; Vitamin D insufficiency (3/23/2014); and Von Willebrand disease (HCC) (dx 1955).    SOCIAL HISTORY  Social History     Social History Main Topics   • Smoking status: Never Smoker   • Smokeless tobacco: Never Used   • Alcohol use 0.6 oz/week     1 Glasses of wine per week      Comment: 1/week   • Drug use: No   • Sexual activity: No       SURGICAL HISTORY   has a past surgical history that " "includes cataract phaco with iol (7/2/2012); cataract phaco with iol (7/16/2012); and hysterectomy, vaginal.    CURRENT MEDICATIONS  Home Medications     Reviewed by Mirian Espitia R.N. (Registered Nurse) on 05/19/19 at 1926  Med List Status: Partial   Medication Last Dose Status   atenolol (TENORMIN) 25 MG Tab  Active   CLINDAMYCIN PHOSPHATE,TOPICAL, 1 % Lotion  Active   doxycycline (VIBRAMYCIN) 50 MG capsule  Active   lovastatin (MEVACOR) 40 MG tablet  Active   vitamin D (CHOLECALCIFEROL) 1000 UNIT TABS  Active                ALLERGIES  Allergies   Allergen Reactions   • Aspirin      VonWillebrand's Dz   • Iodine        PHYSICAL EXAM  VITAL SIGNS: BP (!) 208/102   Pulse 71   Temp 36.9 °C (98.4 °F) (Temporal)   Resp 18   Ht 1.702 m (5' 7\")   Wt 71.4 kg (157 lb 6.5 oz)   LMP 10/01/1986   SpO2 97%   BMI 24.65 kg/m²     Pulse ox interpretation: I interpret this pulse ox as normal.  Constitutional: Alert in no apparent distress.  Pleasant 87-year-old woman.  HENT: No signs of trauma, Bilateral external ears normal, Nose normal.   Eyes: Pupils are equal, Conjunctiva normal, Non-icteric.   Neck: Normal range of motion, No tenderness, Supple, No stridor.   Cardiovascular: Regular rate and rhythm, no murmurs. Symmetric distal pulses. No cyanosis of extremities. No peripheral edema of extremities.  Thorax & Lungs: Normal breath sounds, No respiratory distress, No wheezing, No chest tenderness.   Abdomen: Bowel sounds normal, Soft, No tenderness, No masses, No pulsatile masses. No peritoneal signs.  Skin: Warm, Dry, No erythema, No rash.   Back: No midline bony tenderness, No CVA tenderness.   Musculoskeletal: Good range of motion in all major joints. No tenderness to palpation or major deformities noted.   Neurologic: Alert and oriented to person place time situation.  5 out of 5 strength in all extremities.  No facial droop.  Clear speech.  Normal extra ocular movements.  No ataxia.  Psychiatric: Affect normal, " Judgment normal, Mood normal.   Physical Exam      DIAGNOSTIC STUDIES / PROCEDURES    EKG  EKG Interpretation:  Interpreted by me    Rhythm:  Normal sinus rhythm   Rate: 63  Axis: normal  Ectopy: none  Conduction: tall qrs complex in leads V4-V6  ST Segments: no acute change  T Waves:no twave inversions  Q Waves: none  Clinical Impression: LVH with normal sinus rhythm      LABS  Pertinent Labs & Imaging studies reviewed. (See chart for details)    RADIOLOGY  Pertinent Labs & Imaging studies reviewed. (See chart for details)    COURSE & MEDICAL DECISION MAKING  Pertinent Labs & Imaging studies reviewed. (See chart for details)    7:32 PM This is an emergent evaluation of a  87 y.o. female with von Willebrand's disease presenting with concerns for possible head injury after having syncopal episode yesterday.  She has a slight headache but has had similar headaches in the past.  Yesterday she had 2 syncopal episodes that were centered around to having diarrhea.  This was over 24 hours ago.  She has had no other symptoms today.  Currently besides mild headache she is asymptomatic.  Given von Willebrand's disease I believe this would be equivalent to taking anticoagulants.  Will obtain CT of the head.  Also because of syncope I am going to order a EKG, chest x-ray, troponin, CBC, CMP.  I suspect most likely though that episode was due to vasovagal stimulus, rather than arrhythmia/dehydration/MI.    9:36 PM  CT head with out injury. EKG with LVH. Labs within acceptable limits and negative troponin. Episode happened yesterday and she has had no symptoms for 24 hours. I suspect that syncopal episode vasovagal given that it happened associated with bowel movement. Higher risk for venous intracranial bleed with medical history of von willebrand disease, but CT negative. Provided reassurance and I believe she is safe for discharge. By [place] syncope rules she is Low Risk for SErious Outcome requiring admission. Headache  is minimal and she has had similar before. Unlikely related to yesterday's episode.      The patient will return for worsening symptoms and is stable at the time of discharge. The patient verbalizes understanding. Guidance was provided on appropriate use of medications including driving under the influence, overdose, and side effects.         FINAL IMPRESSION    ICD-10-CM   1. Syncope, unspecified syncope type R55   2. Acute nonintractable headache, unspecified headache type R51            This dictation was created using voice recognition software. The accuracy of the dictation is limited to the abilities of the software. I expect there may be some errors of grammar and possibly content. The nursing notes were reviewed and certain aspects of this information were incorporated into this note.    Electronically signed by: Alcides Wright II, 5/19/2019 7:32 PM

## 2019-05-20 NOTE — ED NOTES
Rounded on patient. Assessment completed.  Reports that Grandson suggested that patient come in due to bleeding disorder.  ERP at bedside.

## 2019-05-20 NOTE — ED NOTES
DC Pt home.  Pt aware of f/u instructions, aware to return for any changes or concerns.  Pt verbalized understanding of instructions to follow up with PCP. No further questions upon discharge home from emergency room. Pt ambulated out of ER without difficulty.

## 2019-05-20 NOTE — ED NOTES
Rounded on patient. Updated on POC and timeline.  Patients aware of pending lab work.  No additional needs at this time will continue to monitor.

## 2019-05-21 ENCOUNTER — TELEPHONE (OUTPATIENT)
Dept: MEDICAL GROUP | Age: 84
End: 2019-05-21

## 2019-05-21 NOTE — TELEPHONE ENCOUNTER
I will discuss with patient to adjust blood pressure medication at her appointment on 5/22/2019.    Zoe Mitchell M.D.

## 2019-05-21 NOTE — TELEPHONE ENCOUNTER
1. Caller Name: Lynne                                         Call Back Number: [phone] (home)       Patient approves a detailed voicemail message: yes    Pt states this past Saturday, 5/18/19 experienced diarrhea and passed out, afterwards went to the ER. Pt states was advised to follow up with PCP due to high BP. Offered same day appt-declined, Scheduled next day appt.-Ramandeep Mitchell: Pt wants to increase BP medication.

## 2019-05-22 ENCOUNTER — OFFICE VISIT (OUTPATIENT)
Dept: MEDICAL GROUP | Age: 84
End: 2019-05-22
Payer: MEDICARE

## 2019-05-22 VITALS
HEART RATE: 59 BPM | SYSTOLIC BLOOD PRESSURE: 152 MMHG | OXYGEN SATURATION: 97 % | TEMPERATURE: 97.9 F | WEIGHT: 157.8 LBS | DIASTOLIC BLOOD PRESSURE: 76 MMHG | BODY MASS INDEX: 24.77 KG/M2 | HEIGHT: 67 IN

## 2019-05-22 DIAGNOSIS — E78.5 HYPERLIPIDEMIA WITH TARGET LDL LESS THAN 100: ICD-10-CM

## 2019-05-22 DIAGNOSIS — M62.838 NECK MUSCLE SPASM: ICD-10-CM

## 2019-05-22 DIAGNOSIS — I10 ESSENTIAL HYPERTENSION: ICD-10-CM

## 2019-05-22 DIAGNOSIS — Z91.81 RISK FOR FALLS: ICD-10-CM

## 2019-05-22 DIAGNOSIS — Z09 HOSPITAL DISCHARGE FOLLOW-UP: ICD-10-CM

## 2019-05-22 PROCEDURE — 99214 OFFICE O/P EST MOD 30 MIN: CPT | Performed by: INTERNAL MEDICINE

## 2019-05-22 RX ORDER — AMLODIPINE BESYLATE 5 MG/1
5 TABLET ORAL EVERY EVENING
Qty: 100 TAB | Refills: 3 | Status: SHIPPED | OUTPATIENT
Start: 2019-05-22 | End: 2020-02-14 | Stop reason: SDUPTHER

## 2019-05-22 RX ORDER — PRAVASTATIN SODIUM 40 MG
40 TABLET ORAL EVERY EVENING
Qty: 100 TAB | Refills: 3 | Status: SHIPPED | OUTPATIENT
Start: 2019-05-22 | End: 2020-02-14 | Stop reason: SDUPTHER

## 2019-05-22 ASSESSMENT — ENCOUNTER SYMPTOMS: GENERAL WELL-BEING: GOOD

## 2019-05-22 ASSESSMENT — PATIENT HEALTH QUESTIONNAIRE - PHQ9: CLINICAL INTERPRETATION OF PHQ2 SCORE: 0

## 2019-05-22 ASSESSMENT — ACTIVITIES OF DAILY LIVING (ADL): BATHING_REQUIRES_ASSISTANCE: 0

## 2019-05-22 ASSESSMENT — PAIN SCALES - GENERAL: PAINLEVEL: 5=MODERATE PAIN

## 2019-05-22 NOTE — PROGRESS NOTES
Subjective:   Lynne Dawkins is a 87 y.o. female here today for evaluation and management of:    Hospital discharge follow-up  Essential hypertension  Patient was seen ED [facility] on 5/19/19 and was diagnosed with syncope and acute headache. Preceding events included an episode of diarrhea followed by hot flashes and 2 syncopal episodes when she was in dinner party with friends and family. Work up in the ED ruled out infection and she had a head CT negative for stroke, tumor, or intracranial bleeding. She does report some ongoing fatigue and soft stool. Today she is being seen due to episodes of hypertension. Her BP in clinic today is 152/76 and she is currently taking atenolol 25 mg twice a day without side effects.    Neck muscle spasm  Chronic. Patient has an ache in her neck that has remained unchanged. She has not tried OTC medications or home stretching.    Hyperlipidemia with target LDL less than 100  Chronic. For cholesterol she has been taking Lovastatin 40 mg 1 tablet at bedtime but due to initiation on amlodipine today, have changed cholesterol medication to pravastatin 40 mg once a day at bedtime. She denied any side effects from Lovastatin.  I discussed with patient that amlodipine can have potential interaction with lovastatin so that I recommended to switch lovastatin to pravastatin.  Patient agreed with the plan.      Current medicines (including changes today)  Current Outpatient Prescriptions   Medication Sig Dispense Refill   • amLODIPine (NORVASC) 5 MG Tab Take 1 Tab by mouth every evening. 100 Tab 3   • pravastatin (PRAVACHOL) 40 MG tablet Take 1 Tab by mouth every evening. 100 Tab 3   • atenolol (TENORMIN) 25 MG Tab TAKE ONE TABLET BY MOUTH TWICE A  Tab 3   • doxycycline (VIBRAMYCIN) 50 MG capsule      • CLINDAMYCIN PHOSPHATE,TOPICAL, 1 % Lotion      • vitamin D (CHOLECALCIFEROL) 1000 UNIT TABS Take 2,000 Units by mouth every day.       No current facility-administered medications for  "this visit.      She  has a past medical history of HTN (hypertension); transfusion of packed red blood cells; Hyperlipidemia LDL goal < 100; Vitamin D insufficiency (3/23/2014); and Von Willebrand disease (HCC) (dx 1955).    ROS   No chest pain, no shortness of breath, no abdominal pain       Objective:     /76 (BP Location: Right arm, Patient Position: Sitting, BP Cuff Size: Adult)   Pulse (!) 59   Temp 36.6 °C (97.9 °F) (Temporal)   Ht 1.702 m (5' 7\")   Wt 71.6 kg (157 lb 12.8 oz)   SpO2 97%  Body mass index is 24.71 kg/m².   Physical Exam:  General: Alert, oriented and no acute distress.  Eye contact is good, speech goal directed, affect calm  HEENT: conjunctiva non-injected, sclera non-icteric.  Oral mucous membranes pink and moist with no lesions.  Pinna normal.   Lungs: Normal respiratory effort, clear to auscultation bilaterally with good excursion.  CV: mildly bradycardic and regular rhythm. No murmurs.   Abdomen: soft, non distended, nontender, Bowel sound normal.  Ext: no edema, color normal, vascularity normal, temperature normal  Musculoskeletal exam: moving all extremities freely      Assessment and Plan:   The following treatment plan was discussed     1. Hospital discharge follow-up  - Patient was seen in ED for 2 syncopal episodes and headaches, but has not had any subsequent syncopal episodes. Patient is doing well at this time and we made medication adjustments as outlined below.    2. Essential hypertension  - Patient was initiated on Amlodipine 5 mg once a day in the evening time and I reviewed potential side effects with her including but not limited to constipation and leg swelling. For constipation she was advised to increase intake of water, prune juice, salad, and fibrous foods. She was advised to wear compression stockings during episodes of prolonged sitting/standing. She was advised to sleep with her legs elevated.   - She is advised to continue to take atenolol 25 mg twice a " day.  - Patient was encouraged to check her BP and HR at home.  - amLODIPine (NORVASC) 5 MG Tab; Take 1 Tab by mouth every evening.  Dispense: 100 Tab; Refill: 3    3. Neck muscle spasm  - Patient was advised on use of Tylenol twice a day as needed, heating pad, and gentle stretching.     4. Hyperlipidemia with target LDL less than 100  - Patient was previously taking lovastatin 40 mg every evening for her cholesterol, but due to initiation on Amlodipine today, she was initiated on Pravastatin 40 mg 1 tablet every evening. We did review potential side effects of pravastatin, and she did not have any side effects while on lovastatin.  - pravastatin (PRAVACHOL) 40 MG tablet; Take 1 Tab by mouth every evening.  Dispense: 100 Tab; Refill: 3    5. Risk for falls  - Counseling for getting up slowly, turn on the light when she gets up at night, installing nightlight at home.  She is advised to use a cane or a walker as needed to support balance.  - Review side effects of medications with patient. Recommend to avoid sedating medication as much as possible.  - Patient identified as fall risk.  Appropriate orders and counseling given.      Follow up: Return in about 5 months (around 10/15/2019), or if symptoms worsen or fail to improve, for Hypertension, Hyperlipidemia, Vitamin D insufficiency, Lab review.      I, Yesika Irwin (Paulie), am scribing for, and in the presence of, Zoe Mitchell M.D..  ?   Electronically signed by: Yesika Aguirre), 5/22/2019  ?  I, Zoe Mitchell M.D., personally performed the services described in this documentation, as scribed by Yesika Irwin in my presence, and it is both accurate and complete.      Please note that this dictation was created using voice recognition software. I have made every reasonable attempt to correct obvious errors, but I expect that there may have unintended errors in text, spelling, punctuation, or grammar that I did not discover.

## 2019-06-07 ENCOUNTER — PATIENT OUTREACH (OUTPATIENT)
Dept: HEALTH INFORMATION MANAGEMENT | Facility: OTHER | Age: 84
End: 2019-06-07

## 2019-06-07 NOTE — PROGRESS NOTES
Outcome: Left voicemail/ message    Please transfer to [facility]  [phone] when patient returns call.     WebIZ Checked & Epic Updated:  yes     [facility] Verified: yes     Attempt # 1

## 2019-10-15 ENCOUNTER — TELEPHONE (OUTPATIENT)
Dept: MEDICAL GROUP | Facility: LAB | Age: 84
End: 2019-10-15

## 2019-10-15 ENCOUNTER — APPOINTMENT (OUTPATIENT)
Dept: MEDICAL GROUP | Age: 84
End: 2019-10-15
Payer: MEDICARE

## 2019-10-15 DIAGNOSIS — I10 ESSENTIAL HYPERTENSION: ICD-10-CM

## 2019-10-15 RX ORDER — ATENOLOL 25 MG/1
TABLET ORAL
Qty: 200 TAB | Refills: 3 | Status: SHIPPED | OUTPATIENT
Start: 2019-10-15 | End: 2020-02-14 | Stop reason: SDUPTHER

## 2019-10-15 NOTE — TELEPHONE ENCOUNTER
1. Name: Lynne Dawkins    Call Back Number: [phone]   Patient approves a detailed voicemail message: yes    2. Which medication(s) is being requested? Atenolol    3. What is the preferred Pharmacy? [facility]    Patient was informed they may receive a return phone call from our office with any additional questions before processing this request.

## 2019-11-18 ENCOUNTER — TELEPHONE (OUTPATIENT)
Dept: MEDICAL GROUP | Age: 84
End: 2019-11-18

## 2019-11-18 NOTE — TELEPHONE ENCOUNTER
ESTABLISHED PATIENT PRE-VISIT PLANNING     Patient was NOT contacted to complete PVP.     Note: Patient will not be contacted if there is no indication to call.     1.  Reviewed notes from the last few office visits within the medical group: Yes    2.  If any orders were placed at last visit or intended to be done for this visit (i.e. 6 mos follow-up), do we have Results/Consult Notes?        •  Labs - Labs ordered, completed on 5/19/19 and results are in chart.   Note: If patient appointment is for lab review and patient did not complete labs, check with provider if OK to reschedule patient until labs completed.       •  Imaging - Imaging was not ordered at last office visit.       •  Referrals - No referrals were ordered at last office visit.    3. Is this appointment scheduled as a Hospital Follow-Up? No    4.  Immunizations were updated in Epic using WebIZ?: Epic matches WebIZ       •  Web Iz Recommendations: FLU and SHINGRIX (Shingles)    5.  Patient is due for the following Health Maintenance Topics:   Health Maintenance Due   Topic Date Due   • IMM ZOSTER VACCINES (2 of 3) 01/31/2011   • Annual Wellness Visit  10/02/2016   • IMM INFLUENZA (1) 09/01/2019           6. Orders for overdue Health Maintenance topics pended in Pre-Charting? N\A    7.  AHA (MDX) form printed for Provider? No, already completed    8.  Patient was NOT informed to arrive 15 min prior to their scheduled appointment and bring in their medication bottles.

## 2019-11-19 ENCOUNTER — HOSPITAL ENCOUNTER (OUTPATIENT)
Dept: RADIOLOGY | Facility: MEDICAL CENTER | Age: 84
End: 2019-11-19
Attending: INTERNAL MEDICINE
Payer: MEDICARE

## 2019-11-19 ENCOUNTER — OFFICE VISIT (OUTPATIENT)
Dept: MEDICAL GROUP | Age: 84
End: 2019-11-19
Payer: MEDICARE

## 2019-11-19 VITALS
DIASTOLIC BLOOD PRESSURE: 66 MMHG | OXYGEN SATURATION: 97 % | SYSTOLIC BLOOD PRESSURE: 138 MMHG | HEIGHT: 67 IN | BODY MASS INDEX: 25.05 KG/M2 | WEIGHT: 159.6 LBS | TEMPERATURE: 97.4 F | HEART RATE: 62 BPM

## 2019-11-19 DIAGNOSIS — M25.572 ACUTE LEFT ANKLE PAIN: ICD-10-CM

## 2019-11-19 DIAGNOSIS — I10 ESSENTIAL HYPERTENSION: ICD-10-CM

## 2019-11-19 DIAGNOSIS — E55.9 VITAMIN D INSUFFICIENCY: ICD-10-CM

## 2019-11-19 DIAGNOSIS — E78.5 HYPERLIPIDEMIA WITH TARGET LDL LESS THAN 100: ICD-10-CM

## 2019-11-19 DIAGNOSIS — Z23 NEED FOR VACCINATION: ICD-10-CM

## 2019-11-19 PROCEDURE — 99214 OFFICE O/P EST MOD 30 MIN: CPT | Mod: 25 | Performed by: INTERNAL MEDICINE

## 2019-11-19 PROCEDURE — 73610 X-RAY EXAM OF ANKLE: CPT | Mod: LT

## 2019-11-19 PROCEDURE — 90662 IIV NO PRSV INCREASED AG IM: CPT | Performed by: INTERNAL MEDICINE

## 2019-11-19 PROCEDURE — G0008 ADMIN INFLUENZA VIRUS VAC: HCPCS | Performed by: INTERNAL MEDICINE

## 2019-11-19 SDOH — HEALTH STABILITY: MENTAL HEALTH: HOW OFTEN DO YOU HAVE A DRINK CONTAINING ALCOHOL?: 2-4 TIMES A MONTH

## 2019-11-19 SDOH — HEALTH STABILITY: MENTAL HEALTH: HOW MANY STANDARD DRINKS CONTAINING ALCOHOL DO YOU HAVE ON A TYPICAL DAY?: 1 OR 2

## 2019-11-19 SDOH — HEALTH STABILITY: MENTAL HEALTH: HOW OFTEN DO YOU HAVE 6 OR MORE DRINKS ON ONE OCCASION?: NEVER

## 2019-11-19 ASSESSMENT — PAIN SCALES - GENERAL: PAINLEVEL: NO PAIN

## 2019-11-19 NOTE — PROGRESS NOTES
Subjective:   Lynne Dawkins is a 88 y.o. female here today for evaluation and management of:    Patient did not complete updated blood work prior to today's visit.    Hyperlipidemia with target LDL less than 100  Chronic. Patient reports compliancy with pravastatin 40 QN and denies any associated side effects. She was previously using lovastatin 40 mg QN however due to initiation on amlodipine, I previously changed her from lovastatin to pravastatin. She did not have any side effects with use of lovastatin. Blood panel was previously reviewed at visit on 5/22/19, and was elevated. Today she denies any chest pain or claudication.    I reviewed prior blood work with the patient in clinic today.   Ref. Range 9/14/2018 07:04 3/26/2019 07:11   Cholesterol,Tot Latest Ref Range: 100 - 199 mg/dL 198 213 (H)   Triglycerides Latest Ref Range: 0 - 149 mg/dL 95 128   HDL Latest Ref Range: >=40 mg/dL 93 95   LDL Latest Ref Range: <100 mg/dL 86 92     Essential hypertension  Chronic. Patient reports compliancy with atenolol 25 mg BID and amlodipine 5 mg QN and denies any associated side effects. BP is normal in clinic today at 138/66. She denies any chest pain, shortness of breath, headaches, or lightheadedness.    Vitamin D insufficiency  Chronic. Patient reports compliancy with cholecalciferol 2000 IU QD and denies any associated side effects. She did not complete updated blood work prior to today's visit, however vitamin D level was normal at 37 on 3/26/19.    I reviewed prior blood work with the patient in clinic today.   Ref. Range 9/14/2018 07:04 3/26/2019 07:11   25-Hydroxy   Vitamin D 25 Latest Ref Range: 30 - 100 ng/mL 32 37     Acute left ankle pain  Acute. Patient reports initial left ankle injury 8 years ago while hiking, which did require surgical repair. She reports recently her left ankle once again became painful and swollen, without recent injury/trauma. Pain is 6-7/10 with weight bearing and resolved at  "rest. No home treatments tried. No associated calf pain/swelling.    Current medicines (including changes today)  Current Outpatient Medications   Medication Sig Dispense Refill   • atenolol (TENORMIN) 25 MG Tab TAKE ONE TABLET BY MOUTH TWICE A  Tab 3   • amLODIPine (NORVASC) 5 MG Tab Take 1 Tab by mouth every evening. 100 Tab 3   • pravastatin (PRAVACHOL) 40 MG tablet Take 1 Tab by mouth every evening. 100 Tab 3   • vitamin D (CHOLECALCIFEROL) 1000 UNIT TABS Take 2,000 Units by mouth every day.       No current facility-administered medications for this visit.      She  has a past medical history of HTN (hypertension), transfusion of packed red blood cells, Hyperlipidemia LDL goal < 100, Vitamin D insufficiency (3/23/2014), and Von Willebrand disease (HCC) (dx 1955).    ROS   No chest pain, no shortness of breath, no abdominal pain, no diarrhea, no melena, no hematochezia  Yes left ankle pain and swelling.     Objective:     /66 (BP Location: Left arm, Patient Position: Sitting, BP Cuff Size: Adult)   Pulse 62   Temp 36.3 °C (97.4 °F) (Temporal)   Ht 1.702 m (5' 7\")   Wt 72.4 kg (159 lb 9.6 oz)   SpO2 97%  Body mass index is 25 kg/m².   Physical Exam:  General: Alert, oriented and no acute distress.  Eye contact is good, speech goal directed, affect calm  HEENT: conjunctiva non-injected, sclera non-icteric.  Oral mucous membranes pink and moist with no lesions.  Pinna normal.   Lungs: Normal respiratory effort, clear to auscultation bilaterally with good excursion.  CV: regular rate and rhythm. No murmurs.   Abdomen: soft, non distended, nontender, wounds Bowel sound normal.  Ext: no edema, color normal, vascularity normal, temperature normal  Musculoskeletal exam: Left ankle medial malleolus pain, swelling, and tenderness to palpation      Assessment and Plan:   The following treatment plan was discussed:    1. Hyperlipidemia with target LDL less than 100  - Patient did not complete updated blood " work prior to today's visit. Lipid panel was previously elevated on 3/26/19.  - At visit on 5/22/19 I transitioned patient from lovastatin 40 mg QN to pravastatin 40 mg QN due to initiation on amlodipine 5 mg QN. Patient denies any side effects from lovastatin or pravastatin.   - Patient is advised to continue current regimen of pravastatin 40 mg QN. I reviewed potential risks, benefits, and side effects of this medication with the patient in clinic today.  - Advised to eat low fat, low carbohydrate and high fiber diet as well as do cardio physical exercise regularly.   - Plan to continue to monitor with blood work, which will be ordered and reviewed by their new PCP.    2. Essential hypertension  - Well-controlled. Continue current regimen, atenolol 25 mg BID and amlodipine 5 mg QN.I reviewed potential risks, benefits, and side effects of this medication with the patient in clinic today.  - Recommend to monitor blood pressure and heart rate at home.  - Plan to continue to monitor with blood work, which will be ordered and reviewed by their new PCP.    3. Vitamin D insufficiency  - Well-controlled. Continue current regimen, cholecalciferol 2000 IU QD . I reviewed potential risks, benefits, and side effects of this medication with the patient in clinic today.  - Plan to continue to monitor with blood work, which will be ordered and reviewed by their new PCP.    4. Acute left ankle pain  - Patient reports initial left ankle injury 8 years ago with recent pain and swelling of left ankle. No recent injury/trauma reported.   - I have ordered x-ray of left ankle and will contact patient once resulted. If fracture is present, I will refer patient to Orthopedist.  - At this time I have referred patient to Sports Medicine and Physical Therapy.   - Patient is advised to walk on even surfaces and avoid hiking during healing process.  - At home patient is encouraged to rest, ice, and elevate left ankle and use OTC tylenol PRN  for pain management.  - REFERRAL TO SPORTS MEDICINE  - REFERRAL TO PHYSICAL THERAPY Reason for Therapy: Eval/Treat/Report  - DX-ANKLE 3+ VIEWS LEFT; Future    5. Need for vaccination  - Patient was agreeable to receiving the influenza vaccine in clinic today after discussion of potential risks, benefits, and side effects. Vaccine was administered without adverse effects.  - INFLUENZA VACCINE, HIGH DOSE (65+ ONLY)    6. Health Maintenance   - Patient is due for AWV, which she is advised to schedule with her new PCP.  - Patient was advised to inquire about the shingles vaccine at their local pharmacy due to clinic shortage. I reviewed the potential risks, benefits, and side effects with the patient.    Follow up: Return in about 6 weeks (around 12/31/2019), or if symptoms worsen or fail to improve, for Left ankle pain, hyperlipidemia, hypertension, Vitamin D insufficiency.    I, Yesika Irwin (Scribe), am scribing for, and in the presence of, Zoe Mitchell M.D.. Electronically signed by: Yesika Irwin (Paulie), 11/19/2019.  ?  I, Zoe Mitchell M.D., personally performed the services described in this documentation, as scribed by Yesika Irwin in my presence, and it is both accurate and complete.    Please note that this dictation was created using voice recognition software. I have made every reasonable attempt to correct obvious errors, but I expect that there may have unintended errors in text, spelling, punctuation, or grammar that I did not discover.

## 2019-12-17 PROBLEM — M25.572 ACUTE LEFT ANKLE PAIN: Status: RESOLVED | Noted: 2019-11-19 | Resolved: 2019-12-17

## 2019-12-17 PROBLEM — M62.838 NECK MUSCLE SPASM: Status: RESOLVED | Noted: 2019-05-22 | Resolved: 2019-12-17

## 2019-12-18 ENCOUNTER — APPOINTMENT (OUTPATIENT)
Dept: PHYSICAL THERAPY | Facility: MEDICAL CENTER | Age: 84
End: 2019-12-18
Payer: MEDICARE

## 2019-12-23 ENCOUNTER — PHYSICAL THERAPY (OUTPATIENT)
Dept: PHYSICAL THERAPY | Facility: MEDICAL CENTER | Age: 84
End: 2019-12-23
Attending: INTERNAL MEDICINE
Payer: MEDICARE

## 2019-12-23 DIAGNOSIS — M25.572 ACUTE LEFT ANKLE PAIN: ICD-10-CM

## 2019-12-23 PROCEDURE — 97014 ELECTRIC STIMULATION THERAPY: CPT

## 2019-12-24 NOTE — OP THERAPY EVALUATION
Outpatient Physical Therapy  INITIAL EVALUATION    [facility] Outpatient Physical Therapy  [address]  [place] [address]  Phone:  [phone]  Fax:  [phone]    Date of Evaluation: 12/23/2019    Patient: Lynne Dawkins  YOB: 1931  MRN: [id number]     Referring Provider: [address]  [place] [address]   Referring Diagnosis Acute left ankle pain [M25.572]     Time Calculation  Start time: 0230  Stop time: 0330 Time Calculation (min): 60 minutes       Physical Therapy Occurrence Codes    Date of onset of impairment:  10/23/19   Date physical therapy care plan established or reviewed:  12/23/19   Date physical therapy treatment started:  12/23/19          Chief Complaint: Foot Problem    Visit Diagnoses     ICD-10-CM   1. Acute left ankle pain M25.572         Subjective  88 year old female c/o L ankle pain for the last couple of months worse over the last 3 weeks  Originally hurt L ankle 9 years ago but had recovered from that  Xrays show mild OA  Pain medial side of ankle increased with walking and weight bearing  Pt has von willebrand disease  Pt has brace on L ankle  She is very active hikes 2 miles twice a day  Goals to be painfree and get back to her hiking  Past Medical History:   Diagnosis Date   • HTN (hypertension)    • Hx of transfusion of packed red blood cells    • Hyperlipidemia LDL goal < 100    • Vitamin D insufficiency 3/23/2014   • Von Willebrand disease (HCC) dx 1955     Past Surgical History:   Procedure Laterality Date   • CATARACT PHACO WITH IOL  7/16/2012    Performed by JEREL MACDONALD at [facility]   • CATARACT PHACO WITH IOL  7/2/2012    Performed by JEREL MACDONALD at [facility]   • HYSTERECTOMY, VAGINAL       Social History     Tobacco Use   • Smoking status: Never Smoker   • Smokeless tobacco: Never Used   Substance Use Topics   • Alcohol use: Yes     Alcohol/week: 0.6 oz      Types: 1 Glasses of wine per week     Frequency: 2-4 times a month     Drinks per session: 1 or 2     Binge frequency: Never     Comment: 1/week     Family and Occupational History     Patient does not qualify to have social determinant information on file (likely too young).   Socioeconomic History   • Marital status:      Spouse name: Not on file   • Number of children: 2   • Years of education: Not on file   • Highest education level: Not on file   Occupational History     Employer: OTHER       Objective  Pt ambulates in with a SPC antalgic gait pattern  Significant swelling around med malleolus and above  Increase in temp noted also  Ankle ROM planter is normal  Dorsal to 90  Eversion lacks 20%  Inversion is 50%   MP she is unable to invert other groups 5/5  On palpation very tender med malleolus and just proximal to this    Exercises/Treatment  Time-based treatments/modalities:   Joint mobs L ankle   stim and ice med L ankle  HEP for L ankle     E-stim attended minutes (CPT 12533): 15 minutes    Assessment, Response and Plan:   Prognosis: good    Goals:   Short Term Goals:   Pt to be I with HEP and able to demonstrate  Short term goal time span:  1-2 weeks      Long Term Goals:    Pt to be able to ambulate without assistive device  Pt to be able to return to hiking  Long term goal time span:  4-6 weeks    Plan:   Planned therapy interventions:  Hot or Cold Pack Therapy (CPT 86868), Gait Training (CPT 09607), E Stim Unattended (CPT 36297), Manual Therapy (CPT 22132), Therapeutic Activities (CPT 23448) and Therapeutic Exercise (CPT 00055)  Frequency:  2x week  Duration in weeks:  6  Duration in visits:  12      Functional Assessment Used        Referring provider co-signature:  I have reviewed this plan of care and my co-signature certifies the need for services.  Certification Dates:   From 12/23/19   To 02/28/20    Physician Signature: ________________________________ Date: ______________

## 2019-12-30 ENCOUNTER — PHYSICAL THERAPY (OUTPATIENT)
Dept: PHYSICAL THERAPY | Facility: MEDICAL CENTER | Age: 84
End: 2019-12-30
Attending: INTERNAL MEDICINE
Payer: MEDICARE

## 2019-12-30 DIAGNOSIS — M25.572 ACUTE LEFT ANKLE PAIN: ICD-10-CM

## 2019-12-30 PROCEDURE — 97014 ELECTRIC STIMULATION THERAPY: CPT

## 2019-12-30 NOTE — OP THERAPY DAILY TREATMENT
Outpatient Physical Therapy  DAILY TREATMENT     [facility] Outpatient Physical Therapy  [address]  [place] [address]  Phone:  [phone]  Fax:  [phone]    Date: 12/30/2019    Patient: Lynne Dawkins  YOB: 1931  MRN: [id number]     Time Calculation  Start time: 1030  Stop time: 1100 Time Calculation (min): 30 minutes       Chief Complaint: No chief complaint on file.    Visit #: 2    SUBJECTIVE:  Its feeling better    OBJECTIVE:  Current objective measures:     Pt now ambulating without assistive device slight antalgic gait pattern    Exercises/Treatment  Time-based treatments/modalities:  Manual therapy minutes (CPT 30779): 15 minutes    Joint mobs L ankle and subtalor  STM med L LE   Active ROM L ankle  stim and ice x 15 mins    ASSESSMENT:   Response to treatment:   Pt has a small amount of eversion swelling is decreased  PLAN/RECOMMENDATIONS:   Plan for treatment: therapy treatment to continue next visit.  Planned interventions for next visit: continue with current treatment.

## 2020-01-08 ENCOUNTER — PHYSICAL THERAPY (OUTPATIENT)
Dept: PHYSICAL THERAPY | Facility: MEDICAL CENTER | Age: 85
End: 2020-01-08
Attending: INTERNAL MEDICINE
Payer: MEDICARE

## 2020-01-08 DIAGNOSIS — M25.572 ACUTE LEFT ANKLE PAIN: ICD-10-CM

## 2020-01-08 PROCEDURE — 97014 ELECTRIC STIMULATION THERAPY: CPT

## 2020-01-08 NOTE — OP THERAPY DAILY TREATMENT
Outpatient Physical Therapy  DAILY TREATMENT     [facility] Outpatient Physical Therapy  [address]  [place] [address]  Phone:  [phone]  Fax:  [phone]    Date: 01/08/2020    Patient: Lynne Dawkins  YOB: 1931  MRN: [id number]     Time Calculation  Start time: 0930  Stop time: 1000 Time Calculation (min): 30 minutes       Chief Complaint: No chief complaint on file.    Visit #: 3    SUBJECTIVE:  Reports it cont to feel better    OBJECTIVE:  Current objective measures:     slightly antalgic gait with SPC     Exercises/Treatment  Time-based treatments/modalities:  Manual therapy minutes (CPT 38283): 15 minutes    Joint mobs R ankle all directions   STM med ankle  Strengthening with theraband  Ice and stim med ankle x 15 mins    ASSESSMENT:   Response to treatment:   Less pain around med ankle Pt still has pain with active inversion    PLAN/RECOMMENDATIONS:   Plan for treatment: therapy treatment to continue next visit.  Planned interventions for next visit: continue with current treatment.

## 2020-01-15 ENCOUNTER — PHYSICAL THERAPY (OUTPATIENT)
Dept: PHYSICAL THERAPY | Facility: MEDICAL CENTER | Age: 85
End: 2020-01-15
Attending: INTERNAL MEDICINE
Payer: MEDICARE

## 2020-01-15 DIAGNOSIS — M25.572 ACUTE LEFT ANKLE PAIN: ICD-10-CM

## 2020-01-15 PROCEDURE — 97014 ELECTRIC STIMULATION THERAPY: CPT

## 2020-01-15 NOTE — OP THERAPY DAILY TREATMENT
Outpatient Physical Therapy  DAILY TREATMENT     [facility] Outpatient Physical Therapy  [address]  [place] [address]  Phone:  [phone]  Fax:  [phone]    Date: 01/15/2020    Patient: Lynne Dawkins  YOB: 1931  MRN: [id number]     Time Calculation  Start time: 0830  Stop time: 0900 Time Calculation (min): 30 minutes       Chief Complaint: No chief complaint on file.    Visit #: 4    SUBJECTIVE:  Has been doing excs ankle has been more sore    OBJECTIVE:  Current objective measures:     Pain on palpation med malleolus and sup    Exercises/Treatment  Time-based treatments/modalities:  Manual therapy minutes (CPT 93108): 15 minutes   Ankle and subtalor mobs  STM  Ice and stim med ankle x 15 mins  Taped calcaneous lat      ASSESSMENT:   Response to treatment:   More pain today after increasing excs  PLAN/RECOMMENDATIONS:   Plan for treatment only ROM exc until pain level decreasers

## 2020-01-27 ENCOUNTER — PHYSICAL THERAPY (OUTPATIENT)
Dept: PHYSICAL THERAPY | Facility: MEDICAL CENTER | Age: 85
End: 2020-01-27
Attending: INTERNAL MEDICINE
Payer: MEDICARE

## 2020-01-27 DIAGNOSIS — M25.572 ACUTE LEFT ANKLE PAIN: ICD-10-CM

## 2020-01-27 PROCEDURE — 97014 ELECTRIC STIMULATION THERAPY: CPT

## 2020-01-27 NOTE — OP THERAPY DAILY TREATMENT
Outpatient Physical Therapy  DAILY TREATMENT     [facility] Outpatient Physical Therapy  [address]  [place] [address]  Phone:  [phone]  Fax:  [phone]    Date: 01/27/2020    Patient: Lynne Dawkins  YOB: 1931  MRN: [id number]     Time Calculation  Start time: 0830  Stop time: 0900 Time Calculation (min): 30 minutes       Chief Complaint: No chief complaint on file.    Visit #: 5    SUBJECTIVE:  Its feeling better able to walk with a flat foot    OBJECTIVE:  Current objective measures:     less pain on palpation Pt able to invert without pain    Exercises/Treatment  Time-based treatments/modalities:  Manual therapy minutes (CPT 78654): 15 minutes   Joint mobs all directions L ankle and subtalor  Gentle strengthening exc  Taped calcaneous lat  Stim and ice med ankle x 15 mins      ASSESSMENT:   Response to treatment:   Improved gait less pain     PLAN/RECOMMENDATIONS:   Plan for treatment: therapy treatment to continue next visit.  Planned interventions for next visit: continue with current treatment.

## 2020-02-03 ENCOUNTER — APPOINTMENT (OUTPATIENT)
Dept: PHYSICAL THERAPY | Facility: MEDICAL CENTER | Age: 85
End: 2020-02-03
Attending: INTERNAL MEDICINE
Payer: MEDICARE

## 2020-02-10 ENCOUNTER — PHYSICAL THERAPY (OUTPATIENT)
Dept: PHYSICAL THERAPY | Facility: MEDICAL CENTER | Age: 85
End: 2020-02-10
Attending: INTERNAL MEDICINE
Payer: MEDICARE

## 2020-02-10 DIAGNOSIS — M25.572 ACUTE LEFT ANKLE PAIN: ICD-10-CM

## 2020-02-10 PROCEDURE — 97014 ELECTRIC STIMULATION THERAPY: CPT

## 2020-02-10 NOTE — OP THERAPY DAILY TREATMENT
Outpatient Physical Therapy  DAILY TREATMENT     [facility] Outpatient Physical Therapy  [address]  [place] [address]  Phone:  [phone]  Fax:  [phone]    Date: 02/10/2020    Patient: Lynne Dawkins  YOB: 1931  MRN: [id number]     Time Calculation  Start time: 0830  Stop time: 0900 Time Calculation (min): 30 minutes       Chief Complaint: No chief complaint on file.    Visit #: 6    SUBJECTIVE:  It cont to improve pain now only if on feet for longer periods    OBJECTIVE:  Current objective measures:     Pt is ambulating without cane decreased toe push off    Exercises/Treatment  Time-based treatments/modalities:  Manual therapy minutes (CPT 83219): 15 minutes    Joint mobs L ankle and subtalor  Manual resistance  STM med side of lower leg  stim and ice x 15 mins    ASSESSMENT:   Response to treatment:   L leg below the knee is more swollen will try wearing compression to see if it helps    PLAN/RECOMMENDATIONS:   Plan for treatment: therapy treatment to continue next visit.  Planned interventions for next visit: continue with current treatment and contrast bath (CPT 25934).       
yes

## 2020-02-14 ENCOUNTER — OFFICE VISIT (OUTPATIENT)
Dept: MEDICAL GROUP | Facility: MEDICAL CENTER | Age: 85
End: 2020-02-14
Payer: MEDICARE

## 2020-02-14 VITALS
HEART RATE: 63 BPM | WEIGHT: 156 LBS | DIASTOLIC BLOOD PRESSURE: 80 MMHG | SYSTOLIC BLOOD PRESSURE: 122 MMHG | OXYGEN SATURATION: 98 % | RESPIRATION RATE: 18 BRPM | HEIGHT: 66 IN | BODY MASS INDEX: 25.07 KG/M2 | TEMPERATURE: 98.3 F

## 2020-02-14 DIAGNOSIS — G89.29 CHRONIC PAIN OF LEFT ANKLE: ICD-10-CM

## 2020-02-14 DIAGNOSIS — I87.2 VENOUS INSUFFICIENCY OF BOTH LOWER EXTREMITIES: ICD-10-CM

## 2020-02-14 DIAGNOSIS — I10 ESSENTIAL HYPERTENSION: ICD-10-CM

## 2020-02-14 DIAGNOSIS — E55.9 VITAMIN D INSUFFICIENCY: ICD-10-CM

## 2020-02-14 DIAGNOSIS — M25.572 CHRONIC PAIN OF LEFT ANKLE: ICD-10-CM

## 2020-02-14 DIAGNOSIS — R73.01 IFG (IMPAIRED FASTING GLUCOSE): ICD-10-CM

## 2020-02-14 DIAGNOSIS — D68.00 VON WILLEBRAND DISEASE (HCC): ICD-10-CM

## 2020-02-14 DIAGNOSIS — L98.9 SKIN LESION: ICD-10-CM

## 2020-02-14 DIAGNOSIS — M79.89 LEFT LEG SWELLING: ICD-10-CM

## 2020-02-14 DIAGNOSIS — E78.5 HYPERLIPIDEMIA WITH TARGET LDL LESS THAN 100: ICD-10-CM

## 2020-02-14 PROCEDURE — 99214 OFFICE O/P EST MOD 30 MIN: CPT | Performed by: INTERNAL MEDICINE

## 2020-02-14 RX ORDER — ATENOLOL 25 MG/1
TABLET ORAL
Qty: 200 TAB | Refills: 1 | Status: SHIPPED | OUTPATIENT
Start: 2020-02-14 | End: 2020-08-28 | Stop reason: SDUPTHER

## 2020-02-14 RX ORDER — AMLODIPINE BESYLATE 5 MG/1
5 TABLET ORAL EVERY EVENING
Qty: 100 TAB | Refills: 1 | Status: SHIPPED | OUTPATIENT
Start: 2020-02-14 | End: 2020-08-18

## 2020-02-14 RX ORDER — PRAVASTATIN SODIUM 40 MG
40 TABLET ORAL EVERY EVENING
Qty: 100 TAB | Refills: 1 | Status: SHIPPED | OUTPATIENT
Start: 2020-02-14 | End: 2020-08-18

## 2020-02-14 ASSESSMENT — PATIENT HEALTH QUESTIONNAIRE - PHQ9: CLINICAL INTERPRETATION OF PHQ2 SCORE: 0

## 2020-02-14 NOTE — PROGRESS NOTES
Established Patient    Lynne presents today with the following:    CC: Ankle pain, leg swelling, medication refill    HPI:   88 y.o. female came in for above.    Chronic pain of left ankle  X 6 months for this episode. On medial/posterior side of ankle. Started 9 years ago.  Did not recall any trauma or injury.  Initially had pain then pain which went away. no morning stiffness. Hurt with walking and at the end of the day after activity. Asso: with swelling up to knee.  Denies any instability, fall.  Went to PT x 5 weeks with no major relief. Not taking any pain med.  X-ray was done in November 2019 which showed mild arthritis and soft tissue swelling.    Venous insufficiency of both lower extremities  Used to try intermittent compression device, compression stockings in the past.  Did not tolerate compression device which caused bleeding due to von Willebrand disease, ended up being in hospital.  She does elevate her legs whenever possible.    Von Willebrand disease  No major organ bleeding issues.  Easy bleeding with dental procedure, injury and cuts.    Essential hypertension  Has been stable on atenolol and amlodipine.    Hyperlipidemia  Currently on pravastatin.  Previous lipid profile 9 months ago showed normal LDL.    Vitamin D insufficiency  Currently taking over-the-counter vitamin D 2000 units daily    Skin lesion  Noticed brownish skin lesions on the chest.        ROS:   No abdominal pain, nausea or vomiting.   10 systems reviewed, negative except mentioned above.      Patient Active Problem List    Diagnosis Date Noted   • Chronic pain of left ankle 02/14/2020   • Venous insufficiency of both lower extremities 02/14/2020   • Rosacea 05/15/2017   • Vitamin D insufficiency 03/23/2014   • Von Willebrand disease (HCC) 08/08/2012   • Hyperlipidemia 08/08/2012   • Essential hypertension 08/08/2012       Current Outpatient Medications   Medication Sig Dispense Refill   • atenolol (TENORMIN) 25 MG Tab TAKE  "ONE TABLET BY MOUTH TWICE A  Tab 1   • amLODIPine (NORVASC) 5 MG Tab Take 1 Tab by mouth every evening. 100 Tab 1   • pravastatin (PRAVACHOL) 40 MG tablet Take 1 Tab by mouth every evening. 100 Tab 1   • vitamin D (CHOLECALCIFEROL) 1000 UNIT TABS Take 2,000 Units by mouth every day.       No current facility-administered medications for this visit.          /80 (BP Location: Right arm, Patient Position: Sitting, BP Cuff Size: Adult)   Pulse 63   Temp 36.8 °C (98.3 °F) (Temporal)   Resp 18   Ht 1.676 m (5' 6\")   Wt 70.8 kg (156 lb)   LMP 10/01/1986   SpO2 98%   BMI 25.18 kg/m²     Physical Exam  General: Alert and oriented, No apparent distress.  Eyes: Pupils are equal and reactive. No scleral icterus.  Throat: Clear no erythema or exudates noted.  Neck: Supple. No cervical or supraclavicular lymphadenopathy noted. Thyroid not enlarged.  Lungs: Clear to auscultation bilaterally without any wheezing, crepitations.  Cardiovascular: Regular rate and rhythm. No murmurs, rubs or gallops.  Abdomen: Bowel sound +, soft, non tender, no rebound or guarding, no palpable organomegaly  Extremities: No clubbing, cyanosis, edema.  Skin: hyperpigmented skin lesion on chest.  Neuro: A & O x 4. Normal speech and memory. Motor and sensory grossly normal.     Assessment and Plan    1. Chronic pain of left ankle  2. Venous insufficiency of both lower extremities  3. Left leg swelling  Her ankle pain mostly seem to be from osteoarthritis since it is worse with weightbearing and at the end of the day.   There is left leg swelling up to the knee and signs of venous insufficiency (skin discoloration, varicose vein) in both legs.  - Recommended Tylenol 500 mg 3 times daily as needed.  Continue range of motion exercises at home to strengthen the ankle.  Recommended shoes with good support.  -Since venous insufficiency could contribute to her leg swelling and pain, recommended compression stocking. She did not have " bleeding with it before. If fails, we will refer to vein clinic to see if she is a surgical candidate.  - US-EXTREMITY VENOUS LOWER BILAT to assess superficial and deep venous system.    4. Essential hypertension  -Controlled, continue current medications.  - atenolol (TENORMIN) 25 MG Tab; TAKE ONE TABLET BY MOUTH TWICE A DAY  Dispense: 200 Tab; Refill: 1  - amLODIPine (NORVASC) 5 MG Tab; Take 1 Tab by mouth every evening.  Dispense: 100 Tab; Refill: 1  - Comp Metabolic Panel; Future    5. Hyperlipidemia with target LDL less than 100  - monitor with lab.  Continue pravastatin    6. IFG (impaired fasting glucose)  Last fasting glucose was slightly elevated.  - HEMOGLOBIN A1C; Future    7. Vitamin D insufficiency  -Monitor vitamin D level     8. Von Willebrand disease (HCC)  -Currently no issue.  May complicate problems with venous insufficiency if she needs to have procedure done.    9. Skin lesions  - ? Solar keratosis, refer to derm for further eval.    Return in about 6 months (around 8/14/2020) for Annual wellness visit.    Patient was seen for 40 minutes face to face of which, 20 minutes was spent counseling regarding ankle arthritis, venous insufficiency, impaired fasting glucose.      Signed by: Alexandra Warner M.D.

## 2020-02-14 NOTE — ASSESSMENT & PLAN NOTE
Used to try intermittent compression device, compression stockings in the past.  Did not tolerate compression device which caused bleeding due to von Willebrand disease, ended up being in hospital.  She does elevate her legs whenever possible.

## 2020-02-14 NOTE — NON-PROVIDER
Annual Health Assessment Questions:    1.  Are you currently engaging in any exercise or physical activity? No    2.  How would you describe your mood or emotional well-being today? good    3.  Have you had any falls in the last year? No    4.  Have you noticed any problems with your balance or had difficulty walking? Yes    5.  In the last six months have you experienced any leakage of urine? Yes    6. DPA/Advanced Directive: Patient has Advanced Directive on file.

## 2020-02-14 NOTE — ASSESSMENT & PLAN NOTE
X 6 months for this episode. On medial/posterior side of ankle. Started 9 years ago.  Did not recall any trauma or injury.  Initially had pain then pain which went away. no morning stiffness. Hurt with walking and at the end of the day after activity. Asso: with swelling up to knee.  Denies any instability, fall.  Went to PT x 5 weeks with no major relief. Not taking any pain med.  X-ray was done in November 2019 which showed mild arthritis and soft tissue swelling.

## 2020-02-27 ENCOUNTER — TELEPHONE (OUTPATIENT)
Dept: URGENT CARE | Facility: MEDICAL CENTER | Age: 85
End: 2020-02-27

## 2020-03-30 ENCOUNTER — PATIENT OUTREACH (OUTPATIENT)
Dept: HEALTH INFORMATION MANAGEMENT | Facility: OTHER | Age: 85
End: 2020-03-30

## 2020-03-30 NOTE — PROGRESS NOTES
Called member to introduce myself as her SCP . She said she does not drive and go places anymore so that is why she is not interested. I tried to explain this is a program that is over the phone and she would not need to leave. She just said that she was not interested and asked to be removed from my list. I advised that I would do so.    Attempt # 1

## 2020-04-22 ENCOUNTER — PATIENT OUTREACH (OUTPATIENT)
Dept: HEALTH INFORMATION MANAGEMENT | Facility: OTHER | Age: 85
End: 2020-04-22

## 2020-04-22 NOTE — PROGRESS NOTES
Called member to follow up on the billing questions that she called in yesterday since the PA could not figure out what was going on. I advised that the $45 coming out of her account every month is the premium for SCP. She understood and had no further questions at this time. If the member calls back, please transfer to [phone]

## 2020-08-20 ENCOUNTER — HOSPITAL ENCOUNTER (OUTPATIENT)
Dept: LAB | Facility: MEDICAL CENTER | Age: 85
End: 2020-08-20
Attending: INTERNAL MEDICINE
Payer: MEDICARE

## 2020-08-20 DIAGNOSIS — E78.5 HYPERLIPIDEMIA WITH TARGET LDL LESS THAN 100: ICD-10-CM

## 2020-08-20 DIAGNOSIS — I10 ESSENTIAL HYPERTENSION: ICD-10-CM

## 2020-08-20 DIAGNOSIS — E55.9 VITAMIN D INSUFFICIENCY: ICD-10-CM

## 2020-08-20 DIAGNOSIS — R73.01 IFG (IMPAIRED FASTING GLUCOSE): ICD-10-CM

## 2020-08-20 LAB
25(OH)D3 SERPL-MCNC: 61 NG/ML (ref 30–100)
ALBUMIN SERPL BCP-MCNC: 4.3 G/DL (ref 3.2–4.9)
ALBUMIN/GLOB SERPL: 1.8 G/DL
ALP SERPL-CCNC: 93 U/L (ref 30–99)
ALT SERPL-CCNC: 12 U/L (ref 2–50)
ANION GAP SERPL CALC-SCNC: 7 MMOL/L (ref 7–16)
AST SERPL-CCNC: 20 U/L (ref 12–45)
BILIRUB SERPL-MCNC: 0.5 MG/DL (ref 0.1–1.5)
BUN SERPL-MCNC: 14 MG/DL (ref 8–22)
CALCIUM SERPL-MCNC: 9.2 MG/DL (ref 8.4–10.2)
CHLORIDE SERPL-SCNC: 103 MMOL/L (ref 96–112)
CHOLEST SERPL-MCNC: 196 MG/DL (ref 100–199)
CO2 SERPL-SCNC: 29 MMOL/L (ref 20–33)
CREAT SERPL-MCNC: 0.77 MG/DL (ref 0.5–1.4)
EST. AVERAGE GLUCOSE BLD GHB EST-MCNC: 111 MG/DL
FASTING STATUS PATIENT QL REPORTED: NORMAL
GLOBULIN SER CALC-MCNC: 2.4 G/DL (ref 1.9–3.5)
GLUCOSE SERPL-MCNC: 98 MG/DL (ref 65–99)
HBA1C MFR BLD: 5.5 % (ref 0–5.6)
HDLC SERPL-MCNC: 100 MG/DL
LDLC SERPL CALC-MCNC: 81 MG/DL
POTASSIUM SERPL-SCNC: 4.1 MMOL/L (ref 3.6–5.5)
PROT SERPL-MCNC: 6.7 G/DL (ref 6–8.2)
SODIUM SERPL-SCNC: 139 MMOL/L (ref 135–145)
TRIGL SERPL-MCNC: 73 MG/DL (ref 0–149)

## 2020-08-20 PROCEDURE — 36415 COLL VENOUS BLD VENIPUNCTURE: CPT

## 2020-08-20 PROCEDURE — 83036 HEMOGLOBIN GLYCOSYLATED A1C: CPT

## 2020-08-20 PROCEDURE — 80061 LIPID PANEL: CPT

## 2020-08-20 PROCEDURE — 80053 COMPREHEN METABOLIC PANEL: CPT

## 2020-08-20 PROCEDURE — 82306 VITAMIN D 25 HYDROXY: CPT

## 2020-08-28 ENCOUNTER — OFFICE VISIT (OUTPATIENT)
Dept: MEDICAL GROUP | Facility: MEDICAL CENTER | Age: 85
End: 2020-08-28
Payer: MEDICARE

## 2020-08-28 VITALS
HEIGHT: 65 IN | RESPIRATION RATE: 16 BRPM | BODY MASS INDEX: 24.93 KG/M2 | SYSTOLIC BLOOD PRESSURE: 120 MMHG | HEART RATE: 70 BPM | OXYGEN SATURATION: 95 % | TEMPERATURE: 98.1 F | WEIGHT: 149.6 LBS | DIASTOLIC BLOOD PRESSURE: 72 MMHG

## 2020-08-28 DIAGNOSIS — L98.9 SCALP LESION: ICD-10-CM

## 2020-08-28 DIAGNOSIS — E78.00 PURE HYPERCHOLESTEROLEMIA: ICD-10-CM

## 2020-08-28 DIAGNOSIS — I10 ESSENTIAL HYPERTENSION: ICD-10-CM

## 2020-08-28 PROCEDURE — 99213 OFFICE O/P EST LOW 20 MIN: CPT | Performed by: INTERNAL MEDICINE

## 2020-08-28 RX ORDER — ATENOLOL 25 MG/1
TABLET ORAL
Qty: 200 TAB | Refills: 3 | Status: SHIPPED | OUTPATIENT
Start: 2020-08-28 | End: 2021-02-09 | Stop reason: SDUPTHER

## 2020-08-28 RX ORDER — PRAVASTATIN SODIUM 40 MG
TABLET ORAL
Qty: 100 TAB | Refills: 3 | Status: SHIPPED | OUTPATIENT
Start: 2020-08-28 | End: 2021-02-09 | Stop reason: SDUPTHER

## 2020-08-28 RX ORDER — AMLODIPINE BESYLATE 5 MG/1
TABLET ORAL
Qty: 100 TAB | Refills: 3 | Status: SHIPPED | OUTPATIENT
Start: 2020-08-28 | End: 2021-02-09 | Stop reason: SDUPTHER

## 2020-08-28 ASSESSMENT — FIBROSIS 4 INDEX: FIB4 SCORE: 1.56

## 2020-08-28 NOTE — PROGRESS NOTES
Chief Complaint   Patient presents with   • Follow-Up     derm referral         HPI:  Lynne Dawkins is a 88 y.o. here for Medicare Annual Wellness Visit     Patient Active Problem List    Diagnosis Date Noted   • Chronic pain of left ankle 02/14/2020   • Venous insufficiency of both lower extremities 02/14/2020   • Skin lesion 02/14/2020   • Rosacea 05/15/2017   • Vitamin D insufficiency 03/23/2014   • Von Willebrand disease (HCC) 08/08/2012   • Hyperlipidemia 08/08/2012   • Essential hypertension 08/08/2012       Current Outpatient Medications   Medication Sig Dispense Refill   • amLODIPine (NORVASC) 5 MG Tab TAKE ONE TABLET BY MOUTH EVERY EVENING *NORVASC* 100 Tab 0   • pravastatin (PRAVACHOL) 40 MG tablet TAKE ONE TABLET BY MOUTH EVERY EVENING *PRAVACHOL* 100 Tab 0   • atenolol (TENORMIN) 25 MG Tab TAKE ONE TABLET BY MOUTH TWICE A  Tab 1   • vitamin D (CHOLECALCIFEROL) 1000 UNIT TABS Take 2,000 Units by mouth every day.       No current facility-administered medications for this visit.             Current supplements as per medication list.       Allergies: Aspirin and Iodine    Current social contact/activities: live by self, son in town, another son in [place]. Have great neighbors.      She  reports that she has never smoked. She has never used smokeless tobacco. She reports current alcohol use of about 0.6 oz of alcohol per week. She reports that she does not use drugs.  Counseling given: Not Answered      DPA/Advanced Directive:  {[place] ADVANCED DIRECTIVE:32810}    ROS:    Gait: Uses {DEVICE:53926}  Ostomy: {AWV YES / NO:21192}  Other tubes: {AWV YES / NO:21192}  Amputations: {AWV YES / NO:21192}  Chronic oxygen use: {AWV YES / NO:21192}  Last eye exam: ***  Wears hearing aids: {AWV YES / NO:21192}   : {Urinary leakage?:20840}    Screening:  ***  Depression Screening    Little interest or pleasure in doing things?     Feeling down, depressed , or hopeless?    Trouble falling or staying asleep, or  sleeping too much?     Feeling tired or having little energy?     Poor appetite or overeating?     Feeling bad about yourself - or that you are a failure or have let yourself or your family down?    Trouble concentrating on things, such as reading the newspaper or watching television?    Moving or speaking so slowly that other people could have noticed.  Or the opposite - being so fidgety or restless that you have been moving around a lot more than usual?     Thoughts that you would be better off dead, or of hurting yourself?     Patient Health Questionnaire Score:      If depressive symptoms identified deferred to follow up visit unless specifically addressed in assessment and plan.    Interpretation of PHQ-9 Total Score   Score Severity   1-4 No Depression   5-9 Mild Depression   10-14 Moderate Depression   15-19 Moderately Severe Depression   20-27 Severe Depression      Screening for Cognitive Impairment    Three Minute Recall (river, nation, finger)  /3    Francisco clock face with all 12 numbers and set the hands to show 10 past 11.       Cognitive concerns identified deferred for follow up unless specifically addressed in assessment and plan.    Fall Risk Assessment    Has the patient had two or more falls in the last year or any fall with injury in the last year?       Safety Assessment    Throw rugs on floor.     Handrails on all stairs.     Good lighting in all hallways.     Difficulty hearing.     Patient counseled about all safety risks that were identified.    Functional Assessment ADLs    Are there any barriers preventing you from cooking for yourself or meeting nutritional needs?   .    Are there any barriers preventing you from driving safely or obtaining transportation?   .    Are there any barriers preventing you from using a telephone or calling for help?   .    Are there any barriers preventing you from shopping?   .    Are there any barriers preventing you from taking care of your own finances?   .     Are there any barriers preventing you from managing your medications?   .    Are there any barriers preventing you from showering, bathing or dressing yourself?  .    Are you currently engaging in any exercise or physical activity?   .     What is your perception of your health?   .      Health Maintenance Summary                IMM ZOSTER VACCINES Overdue 1/31/2011      Done 12/6/2010 Imm Admin: Zoster Vaccine Live (ZVL) (Zostavax) - HISTORICAL DATA    Annual Wellness Visit Overdue 10/2/2016      Done 10/2/2015 Visit Dx: Medicare annual wellness visit, subsequent    IMM INFLUENZA Next Due 9/1/2020      Done 11/19/2019 Imm Admin: Influenza Vaccine Adult HD     Patient has more history with this topic...    IMM DTaP/Tdap/Td Vaccine Next Due 12/6/2020      Done 12/6/2010 Imm Admin: Tdap Vaccine          Patient Care Team:  Alexandra Warner M.D. as PCP - General (Internal Medicine)  Nnamdi Wells O.D. (Optometry)  Maureen Gallego, PT as Physical Therapy (Physical Therapy)      Social History     Tobacco Use   • Smoking status: Never Smoker   • Smokeless tobacco: Never Used   Substance Use Topics   • Alcohol use: Yes     Alcohol/week: 0.6 oz     Types: 1 Glasses of wine per week     Frequency: 2-4 times a month     Drinks per session: 1 or 2     Binge frequency: Never     Comment: 1/week   • Drug use: No     Family History   Problem Relation Age of Onset   • Cancer Mother         d 33 yo. Unk type.   • Heart Disease Sister         d 69   • Cancer Brother         d 73 brain cancer     She  has a past medical history of Bleeding disorder (HCC), HTN (hypertension), transfusion of packed red blood cells, Hyperlipidemia LDL goal < 100, Hypertension, and Vitamin D insufficiency (3/23/2014).   Past Surgical History:   Procedure Laterality Date   • CATARACT PHACO WITH IOL  7/16/2012    Performed by JEREL MACDONALD at [facility]   • CATARACT PHACO WITH IOL  7/2/2012    Performed by JEREL MACDONALD  "at [facility]   • HYSTERECTOMY, VAGINAL         Exam:   /72 (BP Location: Left arm, Patient Position: Sitting, BP Cuff Size: Adult)   Pulse 70   Temp 36.7 °C (98.1 °F) (Temporal)   Resp 16   Ht 1.643 m (5' 4.7\")   Wt 67.9 kg (149 lb 9.6 oz)   SpO2 95%  Body mass index is 25.13 kg/m².    Hearing {GOOD/FAIR/POOR/EXCELLENT:48174}.    Dentition {DENTITION:25881}  Alert, oriented in no acute distress.  Eye contact is good, speech goal directed, affect calm    Assessment and Plan. The following treatment and monitoring plan is recommended:  ***  No diagnosis found.      Services suggested: { AWV COORDINATION OF SERVICES:20405}  Health Care Screening: Age-appropriate preventive services recommended by USPTF and ACIP covered by Medicare were discussed today. Services ordered if indicated and agreed upon by the patient.  Referrals offered: Community-based lifestyle interventions to reduce health risks and promote self-management and wellness, fall prevention, nutrition, physical activity, tobacco-use cessation, weight loss, and mental health services as per orders if indicated.    Discussion today about general wellness and lifestyle habits:    · Prevent falls and reduce trip hazards; Cautioned about securing or removing rugs.  · Have a working fire alarm and carbon monoxide detector;   · Engage in regular physical activity and social activities     Follow-up: No follow-ups on file.    "

## 2020-08-28 NOTE — LETTER
[employer]  Alexandar Warner M.D.  [address]  [place] [address]  Fax: [phone]   Authorization for Release/Disclosure of   Protected Health Information   Name: NORMA DAWKINS : 1931 SSN: [id number]   Address: [facility] [place] [address] Phone:    [phone] (home)    I authorize the entity listed below to release/disclose the PHI below to:   [employer]/Alexandra Warner M.D. and Alexandra Warner M.D.   Provider or Entity Name:  [facility]   Address   City, [facility]   Phone:      Fax:  [phone]   Reason for request: continuity of care   Information to be released:    [  ] LAST COLONOSCOPY,  including any PATH REPORT and follow-up  [  ] LAST FIT/COLOGUARD RESULT [  ] LAST DEXA  [  ] LAST MAMMOGRAM  [  ] LAST PAP  [  ] LAST LABS [  ] RETINA EXAM REPORT  [  ] IMMUNIZATION RECORDS  [x ] Release all info      [  ] Check here and initial the line next to each item to release ALL health information INCLUDING  _____ Care and treatment for drug and / or alcohol abuse  _____ HIV testing, infection status, or AIDS  _____ Genetic Testing    DATES OF SERVICE OR TIME PERIOD TO BE DISCLOSED: _____________  I understand and acknowledge that:  * This Authorization may be revoked at any time by you in writing, except if your health information has already been used or disclosed.  * Your health information that will be used or disclosed as a result of you signing this authorization could be re-disclosed by the recipient. If this occurs, your re-disclosed health information may no longer be protected by State or Federal laws.  * You may refuse to sign this Authorization. Your refusal will not affect your ability to obtain treatment.  * This Authorization becomes effective upon signing and will  on (date) __________.      If no date is indicated, this Authorization will  one (1) year from the signature date.    Name: Norma Dawkins    Signature: continuity of care   Date:     8/28/2020       PLEASE FAX REQUESTED RECORDS BACK TO: [phone]
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
## 2020-09-09 NOTE — ASSESSMENT & PLAN NOTE
She is taking lovastatin 40 mg every evening.  Her cholesterol is stable and well controlled.  She does not have side effects from taking lovastatin.  She has normal liver enzymes on her recent blood test on 9/14/18.    Results for NORMA DHALIWAL KENTRELL (MRN [id number]) as of 10/1/2018 19:20   Ref. Range 9/14/2018 07:04   Cholesterol,Tot Latest Ref Range: 100 - 199 mg/dL 198   Triglycerides Latest Ref Range: 0 - 149 mg/dL 95   HDL Latest Ref Range: >=40 mg/dL 93   LDL Latest Ref Range: <100 mg/dL 86      Tetracycline Pregnancy And Lactation Text: This medication is Pregnancy Category D and not consider safe during pregnancy. It is also excreted in breast milk.

## 2020-10-09 ENCOUNTER — TELEPHONE (OUTPATIENT)
Dept: PHYSICAL THERAPY | Facility: REHABILITATION | Age: 85
End: 2020-10-09

## 2020-10-09 NOTE — OP THERAPY DISCHARGE SUMMARY
Outpatient Physical Therapy  DISCHARGE SUMMARY NOTE      [facility] [address]  [address]  [place] [address]  Phone:  [phone]  Fax:  [phone]    Date of Visit: 10/09/2020    Patient: Lynne Dawkins  YOB: 1931  MRN: [id number]     Referring Provider: No referring provider defined for this encounter.   Referring Diagnosis No admission diagnoses are documented for this encounter.         Functional Assessment Used        Your patient is being discharged from Physical Therapy with the following comments:   · other    Comments:  This patient was seen by this clinic under the care of Celso Gallego PT.  The provider is no longer with this clinic and the patient has not been treated in greater than 30 days.  The patient will be d/c'd on his behalf.      Megan Anderson, PT, DPT    Date: 10/9/2020

## 2021-01-11 DIAGNOSIS — Z23 NEED FOR VACCINATION: ICD-10-CM

## 2021-02-03 ENCOUNTER — TELEPHONE (OUTPATIENT)
Dept: MEDICAL GROUP | Facility: MEDICAL CENTER | Age: 86
End: 2021-02-03

## 2021-02-03 ENCOUNTER — HOSPITAL ENCOUNTER (OUTPATIENT)
Dept: LAB | Facility: MEDICAL CENTER | Age: 86
End: 2021-02-03
Attending: INTERNAL MEDICINE
Payer: MEDICARE

## 2021-02-03 LAB
25(OH)D3 SERPL-MCNC: 74 NG/ML (ref 30–100)
ALBUMIN SERPL BCP-MCNC: 4.2 G/DL (ref 3.2–4.9)
ALBUMIN/GLOB SERPL: 1.6 G/DL
ALP SERPL-CCNC: 102 U/L (ref 30–99)
ALT SERPL-CCNC: 12 U/L (ref 2–50)
ANION GAP SERPL CALC-SCNC: 9 MMOL/L (ref 7–16)
AST SERPL-CCNC: 17 U/L (ref 12–45)
BILIRUB SERPL-MCNC: 0.5 MG/DL (ref 0.1–1.5)
BUN SERPL-MCNC: 12 MG/DL (ref 8–22)
CALCIUM SERPL-MCNC: 9.2 MG/DL (ref 8.4–10.2)
CHLORIDE SERPL-SCNC: 102 MMOL/L (ref 96–112)
CHOLEST SERPL-MCNC: 204 MG/DL (ref 100–199)
CO2 SERPL-SCNC: 30 MMOL/L (ref 20–33)
CREAT SERPL-MCNC: 0.74 MG/DL (ref 0.5–1.4)
EST. AVERAGE GLUCOSE BLD GHB EST-MCNC: 123 MG/DL
FASTING STATUS PATIENT QL REPORTED: NORMAL
GLOBULIN SER CALC-MCNC: 2.6 G/DL (ref 1.9–3.5)
GLUCOSE SERPL-MCNC: 91 MG/DL (ref 65–99)
HBA1C MFR BLD: 5.9 % (ref 0–5.6)
HDLC SERPL-MCNC: 110 MG/DL
LDLC SERPL CALC-MCNC: 81 MG/DL
POTASSIUM SERPL-SCNC: 4.3 MMOL/L (ref 3.6–5.5)
PROT SERPL-MCNC: 6.8 G/DL (ref 6–8.2)
SODIUM SERPL-SCNC: 141 MMOL/L (ref 135–145)
TRIGL SERPL-MCNC: 66 MG/DL (ref 0–149)

## 2021-02-03 PROCEDURE — 83036 HEMOGLOBIN GLYCOSYLATED A1C: CPT

## 2021-02-03 PROCEDURE — 80053 COMPREHEN METABOLIC PANEL: CPT

## 2021-02-03 PROCEDURE — 36415 COLL VENOUS BLD VENIPUNCTURE: CPT

## 2021-02-03 PROCEDURE — 82306 VITAMIN D 25 HYDROXY: CPT

## 2021-02-03 PROCEDURE — 80061 LIPID PANEL: CPT

## 2021-02-03 NOTE — TELEPHONE ENCOUNTER
ESTABLISHED PATIENT PRE-VISIT PLANNING     Patient was NOT contacted to complete PVP.     Note: Patient will not be contacted if there is no indication to call.     1.  Reviewed notes from the last few office visits within the medical group: Yes    2.  If any orders were placed at last visit or intended to be done for this visit (i.e. 6 mos follow-up), do we have Results/Consult Notes?         •  Labs - Labs ordered, completed on 08/20/2020 and results are in chart.  Note: If patient appointment is for lab review and patient did not complete labs, check with provider if OK to reschedule patient until labs completed.       •  Imaging - Imaging was not ordered at last office visit.       •  Referrals - Referral ordered, patient has NOT been seen.    3. Is this appointment scheduled as a Hospital Follow-Up? No    4.  Immunizations were updated in Epic using Reconcile Outside Information activity? Yes    5.  Patient is due for the following Health Maintenance Topics:   Health Maintenance Due   Topic Date Due   • COVID-19 Vaccine (1 of 2) 09/23/1947   • IMM ZOSTER VACCINES (2 of 3) 01/31/2011   • Annual Wellness Visit  10/02/2016   • IMM INFLUENZA (1) 09/01/2020   • IMM DTaP/Tdap/Td Vaccine (2 - Td) 12/06/2020     6.  AHA (Pulse8) form printed for Provider? Email sent to [facility] requesting form

## 2021-02-05 ENCOUNTER — HOSPITAL ENCOUNTER (OUTPATIENT)
Dept: LAB | Facility: MEDICAL CENTER | Age: 86
End: 2021-02-05
Attending: INTERNAL MEDICINE
Payer: MEDICARE

## 2021-02-05 LAB
APTT PPP: 32.7 SEC (ref 24.7–36)
BASOPHILS # BLD AUTO: 0.7 % (ref 0–1.8)
BASOPHILS # BLD: 0.04 K/UL (ref 0–0.12)
CLOSURE TME COLL+ADP BLD: >300 SEC (ref 62–104)
EOSINOPHIL # BLD AUTO: 0.17 K/UL (ref 0–0.51)
EOSINOPHIL NFR BLD: 3.2 % (ref 0–6.9)
ERYTHROCYTE [DISTWIDTH] IN BLOOD BY AUTOMATED COUNT: 44.4 FL (ref 35.9–50)
HCT VFR BLD AUTO: 41.4 % (ref 37–47)
HGB BLD-MCNC: 13.5 G/DL (ref 12–16)
IMM GRANULOCYTES # BLD AUTO: 0.02 K/UL (ref 0–0.11)
IMM GRANULOCYTES NFR BLD AUTO: 0.4 % (ref 0–0.9)
INR PPP: 0.94 (ref 0.87–1.13)
LYMPHOCYTES # BLD AUTO: 1.34 K/UL (ref 1–4.8)
LYMPHOCYTES NFR BLD: 25 % (ref 22–41)
MCH RBC QN AUTO: 30.5 PG (ref 27–33)
MCHC RBC AUTO-ENTMCNC: 32.6 G/DL (ref 33.6–35)
MCV RBC AUTO: 93.5 FL (ref 81.4–97.8)
MEDICATIONS NOTED 1688: ABNORMAL
MONOCYTES # BLD AUTO: 0.55 K/UL (ref 0–0.85)
MONOCYTES NFR BLD AUTO: 10.3 % (ref 0–13.4)
NEUTROPHILS # BLD AUTO: 3.23 K/UL (ref 2–7.15)
NEUTROPHILS NFR BLD: 60.4 % (ref 44–72)
NRBC # BLD AUTO: 0 K/UL
NRBC BLD-RTO: 0 /100 WBC
PLATELET # BLD AUTO: 281 K/UL (ref 164–446)
PLT FUNCTION COL/EPI  1661: >249 SEC (ref 83–170)
PMV BLD AUTO: 9.3 FL (ref 9–12.9)
PROTHROMBIN TIME: 12.3 SEC (ref 12–14.6)
RBC # BLD AUTO: 4.43 M/UL (ref 4.2–5.4)
WBC # BLD AUTO: 5.4 K/UL (ref 4.8–10.8)

## 2021-02-05 PROCEDURE — 85025 COMPLETE CBC W/AUTO DIFF WBC: CPT

## 2021-02-05 PROCEDURE — 85246 CLOT FACTOR VIII VW ANTIGEN: CPT

## 2021-02-05 PROCEDURE — 85247 CLOT FACTOR VIII MULTIMETRIC: CPT

## 2021-02-05 PROCEDURE — 36415 COLL VENOUS BLD VENIPUNCTURE: CPT

## 2021-02-05 PROCEDURE — 85240 CLOT FACTOR VIII AHG 1 STAGE: CPT

## 2021-02-05 PROCEDURE — 85730 THROMBOPLASTIN TIME PARTIAL: CPT

## 2021-02-05 PROCEDURE — 85245 CLOT FACTOR VIII VW RISTOCTN: CPT

## 2021-02-05 PROCEDURE — 85610 PROTHROMBIN TIME: CPT

## 2021-02-05 PROCEDURE — 85576 BLOOD PLATELET AGGREGATION: CPT | Mod: 91

## 2021-02-09 DIAGNOSIS — E78.00 PURE HYPERCHOLESTEROLEMIA: ICD-10-CM

## 2021-02-09 DIAGNOSIS — I10 ESSENTIAL HYPERTENSION: ICD-10-CM

## 2021-02-09 RX ORDER — AMLODIPINE BESYLATE 5 MG/1
TABLET ORAL
Qty: 100 TAB | Refills: 1 | Status: SHIPPED | OUTPATIENT
Start: 2021-02-09 | End: 2021-06-16 | Stop reason: SDUPTHER

## 2021-02-09 RX ORDER — ATENOLOL 25 MG/1
TABLET ORAL
Qty: 200 TAB | Refills: 1 | Status: SHIPPED | OUTPATIENT
Start: 2021-02-09 | End: 2021-06-16 | Stop reason: SDUPTHER

## 2021-02-09 RX ORDER — PRAVASTATIN SODIUM 40 MG
TABLET ORAL
Qty: 100 TAB | Refills: 1 | Status: SHIPPED | OUTPATIENT
Start: 2021-02-09 | End: 2021-06-16 | Stop reason: SDUPTHER

## 2021-02-10 ENCOUNTER — APPOINTMENT (OUTPATIENT)
Dept: MEDICAL GROUP | Facility: MEDICAL CENTER | Age: 86
End: 2021-02-10
Payer: MEDICARE

## 2021-02-11 LAB
FACT VIII ACT/NOR PPP: 25 % (ref 56–191)
VWF AG ACT/NOR PPP IA: 22 % (ref 52–214)
VWF MULTIMERS PPP QL: ABNORMAL
VWF:RCO ACT/NOR PPP PL AGG: <10 % (ref 51–215)

## 2021-06-07 ENCOUNTER — TELEPHONE (OUTPATIENT)
Dept: MEDICAL GROUP | Facility: MEDICAL CENTER | Age: 86
End: 2021-06-07

## 2021-06-07 NOTE — TELEPHONE ENCOUNTER
1. Caller Name: Lakisha                         Call Back Number: [phone]  Left a message ....      “Hello, this is ____________ calling _______________’s office. We noticed you have not come into your office this year for your Annual Wellness Visit and we would like to ensure you are seen! If you are able to inform me of what date and time works best for you, I am happy to assist you in scheduling this appointment!”

## 2021-06-16 ENCOUNTER — OFFICE VISIT (OUTPATIENT)
Dept: MEDICAL GROUP | Facility: MEDICAL CENTER | Age: 86
End: 2021-06-16
Payer: MEDICARE

## 2021-06-16 VITALS
TEMPERATURE: 97.7 F | SYSTOLIC BLOOD PRESSURE: 152 MMHG | OXYGEN SATURATION: 96 % | BODY MASS INDEX: 25.49 KG/M2 | HEART RATE: 68 BPM | WEIGHT: 153 LBS | HEIGHT: 65 IN | DIASTOLIC BLOOD PRESSURE: 90 MMHG

## 2021-06-16 DIAGNOSIS — R19.7 DIARRHEA, UNSPECIFIED TYPE: ICD-10-CM

## 2021-06-16 DIAGNOSIS — E78.5 DYSLIPIDEMIA: ICD-10-CM

## 2021-06-16 DIAGNOSIS — I10 ESSENTIAL HYPERTENSION: ICD-10-CM

## 2021-06-16 DIAGNOSIS — D68.00 VON WILLEBRAND DISEASE (HCC): ICD-10-CM

## 2021-06-16 DIAGNOSIS — E78.00 PURE HYPERCHOLESTEROLEMIA: ICD-10-CM

## 2021-06-16 DIAGNOSIS — R55 SITUATIONAL SYNCOPE: ICD-10-CM

## 2021-06-16 DIAGNOSIS — Z23 NEED FOR VACCINATION: ICD-10-CM

## 2021-06-16 DIAGNOSIS — R73.03 PREDIABETES: ICD-10-CM

## 2021-06-16 DIAGNOSIS — M79.18 RIGHT BUTTOCK PAIN: ICD-10-CM

## 2021-06-16 PROCEDURE — 99214 OFFICE O/P EST MOD 30 MIN: CPT | Mod: 25 | Performed by: INTERNAL MEDICINE

## 2021-06-16 PROCEDURE — 90715 TDAP VACCINE 7 YRS/> IM: CPT | Performed by: INTERNAL MEDICINE

## 2021-06-16 PROCEDURE — 90471 IMMUNIZATION ADMIN: CPT | Performed by: INTERNAL MEDICINE

## 2021-06-16 RX ORDER — AMLODIPINE BESYLATE 5 MG/1
TABLET ORAL
Qty: 100 TABLET | Refills: 1 | Status: SHIPPED
Start: 2021-06-16 | End: 2022-01-05

## 2021-06-16 RX ORDER — ATENOLOL 25 MG/1
TABLET ORAL
Qty: 200 TABLET | Refills: 1 | Status: SHIPPED | OUTPATIENT
Start: 2021-06-16 | End: 2022-04-13

## 2021-06-16 RX ORDER — PRAVASTATIN SODIUM 40 MG
TABLET ORAL
Qty: 100 TABLET | Refills: 1 | Status: SHIPPED | OUTPATIENT
Start: 2021-06-16 | End: 2022-06-14

## 2021-06-16 ASSESSMENT — PATIENT HEALTH QUESTIONNAIRE - PHQ9: CLINICAL INTERPRETATION OF PHQ2 SCORE: 0

## 2021-06-16 ASSESSMENT — FIBROSIS 4 INDEX: FIB4 SCORE: 1.55

## 2021-06-16 NOTE — PROGRESS NOTES
Subjective:     Lynne Dawkins is a 89 y.o. female here today for review of chronic medical issues and Annual Health Assessment.    She has been having right buttock pain recently, had one severe episode when going to the back and had to move around before driving.    Over the last 8 or 9 months, she has 3 episode of diarrhea.  She would have 7 diarrhea episode without triggering factor which is company when presyncope or syncope.  The first time it happened at a dinner party, she went to the toilet, had diarrhea episode, followed by syncope.  She was found on the floor by son and daughter-in-law.  Subsequent episodes, she was careful and sat down on the floor when she had presyncope and was able to avoid syncope.  Usually, it is just one episode of loose bowel movement without blood in stool.    Otherwise she has been doing well.    Health Maintenance Summary                IMM ZOSTER VACCINES Overdue 1/31/2011      Done 12/6/2010 Imm Admin: Zoster Vaccine Live (ZVL) (Zostavax) - HISTORICAL DATA    Annual Wellness Visit Overdue 10/2/2016      Done 10/2/2015 Visit Dx: Medicare annual wellness visit, subsequent    IMM INFLUENZA Next Due 9/1/2021      Done 11/19/2019 Imm Admin: Influenza Vaccine Adult HD     Patient has more history with this topic...    IMM DTaP/Tdap/Td Vaccine Next Due 6/16/2031      Done 6/16/2021 Imm Admin: Tdap Vaccine     Patient has more history with this topic...           Annual Health Assessment Questions:     1.  Are you currently engaging in any exercise or physical activity? Yes, walk dogs every day, gardening    2.  How would you describe your mood or emotional well-being today? good    3.  Have you had any falls in the last year? No    4.  Have you noticed any problems with your balance or had difficulty walking? No    5.  In the last six months have you experienced any leakage of urine? Yes    6. DPA/Advanced Directive: Patient does not have an Advanced Directive.  A packet and  "workshop information was given on Advanced Directives.    Current medicines (including changes today)  Current Outpatient Medications   Medication Sig Dispense Refill   • atenolol (TENORMIN) 25 MG Tab TAKE ONE TABLET BY MOUTH TWICE A  tablet 1   • amLODIPine (NORVASC) 5 MG Tab TAKE ONE TABLET BY MOUTH EVERY EVENING *NORVASC* 100 tablet 1   • pravastatin (PRAVACHOL) 40 MG tablet TAKE ONE TABLET BY MOUTH EVERY EVENING *PRAVACHOL* 100 tablet 1   • vitamin D (CHOLECALCIFEROL) 1000 UNIT TABS Take 2,000 Units by mouth every day.       No current facility-administered medications for this visit.       She  has a past medical history of Bleeding disorder (HCC), HTN (hypertension), transfusion of packed red blood cells, Hyperlipidemia LDL goal < 100, Hypertension, and Vitamin D insufficiency (3/23/2014).    Aspirin and Iodine    She  reports that she has never smoked. She has never used smokeless tobacco. She reports current alcohol use of about 0.6 oz of alcohol per week. She reports that she does not use drugs.  Counseling given: Not Answered      ROS    No chest pain, no shortness of breath, no abdominal pain.     Objective:     Physical Exam:  /90   Pulse 68   Temp 36.5 °C (97.7 °F) (Temporal)   Ht 1.643 m (5' 4.7\")   Wt 69.4 kg (153 lb)   SpO2 96%  Body mass index is 25.7 kg/m².    Constitutional: Alert, no distress.  Skin: Warm, dry, good turgor, no rashes in visible areas.  Neck: Trachea midline, no masses, no thyromegaly. No cervical or supraclavicular lymphadenopathy.  Respiratory: Unlabored respiratory effort, lungs clear to auscultation, no wheezes, no rhonchi.  Cardiovascular: Normal S1, S2, no murmur, no edema.  Abdomen: Soft, non-tender, no masses, no hepatosplenomegaly.  Psych: Alert and oriented x3, normal affect and mood.    Assessment and Plan:     1. Right buttock pain  - likely muscle or ligamental sprain. Try topical heat /cold, ibuprofen as needed.  - if no relief, consider PT.    2. " Diarrhea, unspecified type  She had colonoscopy remotely, but she did not tolerate procedure well and got unconscious.  She has chosen not to repeat the procedure again.  There is no appetite or weight loss.  Due to infrequent nature of diarrhea, she would rather not do any further investigation at this point.    3. Situational syncope  -This is likely due to increased vagal tone with diarrhea.  No other cardiac symptoms.  -Observe.  Recommended to keep life alert although she has good neighbors who is regularly checking her window every day.    4. Von Willebrand disease (HCC)  - had a good amount of bleeding during surgical removal of amyloid skin lesion on her scalp. Recovered well now.    5. Essential hypertension  Stable. Refilled.  - atenolol (TENORMIN) 25 MG Tab; TAKE ONE TABLET BY MOUTH TWICE A DAY  Dispense: 200 tablet; Refill: 1  - amLODIPine (NORVASC) 5 MG Tab; TAKE ONE TABLET BY MOUTH EVERY EVENING *NORVASC*  Dispense: 100 tablet; Refill: 1    6. Prediabetes  - monitor HEMOGLOBIN A1C; Future    7. Dyslipidemia  On pravastatin  - pravastatin (PRAVACHOL) 40 MG tablet; TAKE ONE TABLET BY MOUTH EVERY EVENING *PRAVACHOL*  Dispense: 100 tablet; Refill: 1    9. Need for vaccination  - Tdap =>8yo IM      Follow-Up: Return in about 6 months (around 12/16/2021).         PLEASE NOTE: This dictation was created using voice recognition software. I have made every reasonable attempt to correct obvious errors, but I expect that there are errors of grammar and possibly content that I did not discover before finalizing the note.

## 2021-07-20 ENCOUNTER — HOSPITAL ENCOUNTER (OUTPATIENT)
Dept: LAB | Facility: MEDICAL CENTER | Age: 86
End: 2021-07-20
Attending: INTERNAL MEDICINE
Payer: MEDICARE

## 2021-07-28 ENCOUNTER — HOSPITAL ENCOUNTER (OUTPATIENT)
Dept: LAB | Facility: MEDICAL CENTER | Age: 86
End: 2021-07-28
Attending: INTERNAL MEDICINE
Payer: MEDICARE

## 2021-07-28 LAB
APTT PPP: 30.8 SEC (ref 24.7–36)
BASOPHILS # BLD AUTO: 0.5 % (ref 0–1.8)
BASOPHILS # BLD: 0.04 K/UL (ref 0–0.12)
EOSINOPHIL # BLD AUTO: 0.14 K/UL (ref 0–0.51)
EOSINOPHIL NFR BLD: 1.9 % (ref 0–6.9)
ERYTHROCYTE [DISTWIDTH] IN BLOOD BY AUTOMATED COUNT: 45.1 FL (ref 35.9–50)
HCT VFR BLD AUTO: 41.5 % (ref 37–47)
HGB BLD-MCNC: 13.3 G/DL (ref 12–16)
IMM GRANULOCYTES # BLD AUTO: 0.02 K/UL (ref 0–0.11)
IMM GRANULOCYTES NFR BLD AUTO: 0.3 % (ref 0–0.9)
INR PPP: 1 (ref 0.87–1.13)
LYMPHOCYTES # BLD AUTO: 1.76 K/UL (ref 1–4.8)
LYMPHOCYTES NFR BLD: 23.6 % (ref 22–41)
MCH RBC QN AUTO: 30.4 PG (ref 27–33)
MCHC RBC AUTO-ENTMCNC: 32 G/DL (ref 33.6–35)
MCV RBC AUTO: 95 FL (ref 81.4–97.8)
MONOCYTES # BLD AUTO: 0.65 K/UL (ref 0–0.85)
MONOCYTES NFR BLD AUTO: 8.7 % (ref 0–13.4)
NEUTROPHILS # BLD AUTO: 4.85 K/UL (ref 2–7.15)
NEUTROPHILS NFR BLD: 65 % (ref 44–72)
NRBC # BLD AUTO: 0 K/UL
NRBC BLD-RTO: 0 /100 WBC
PLATELET # BLD AUTO: 273 K/UL (ref 164–446)
PMV BLD AUTO: 10.7 FL (ref 9–12.9)
PROTHROMBIN TIME: 12.9 SEC (ref 12–14.6)
RBC # BLD AUTO: 4.37 M/UL (ref 4.2–5.4)
WBC # BLD AUTO: 7.5 K/UL (ref 4.8–10.8)

## 2021-07-28 PROCEDURE — 85576 BLOOD PLATELET AGGREGATION: CPT

## 2021-07-28 PROCEDURE — 85247 CLOT FACTOR VIII MULTIMETRIC: CPT

## 2021-07-28 PROCEDURE — 85610 PROTHROMBIN TIME: CPT

## 2021-07-28 PROCEDURE — 85246 CLOT FACTOR VIII VW ANTIGEN: CPT

## 2021-07-28 PROCEDURE — 85025 COMPLETE CBC W/AUTO DIFF WBC: CPT

## 2021-07-28 PROCEDURE — 36415 COLL VENOUS BLD VENIPUNCTURE: CPT

## 2021-07-28 PROCEDURE — 85245 CLOT FACTOR VIII VW RISTOCTN: CPT

## 2021-07-28 PROCEDURE — 85240 CLOT FACTOR VIII AHG 1 STAGE: CPT

## 2021-07-28 PROCEDURE — 85730 THROMBOPLASTIN TIME PARTIAL: CPT

## 2021-08-04 LAB
FACT VIII ACT/NOR PPP: 22 % (ref 56–191)
VWF AG ACT/NOR PPP IA: 17 % (ref 52–214)
VWF MULTIMERS PPP QL: ABNORMAL
VWF:RCO ACT/NOR PPP PL AGG: <10 % (ref 51–215)

## 2021-08-24 ENCOUNTER — OFFICE VISIT (OUTPATIENT)
Dept: MEDICAL GROUP | Facility: MEDICAL CENTER | Age: 86
End: 2021-08-24

## 2021-08-24 VITALS
DIASTOLIC BLOOD PRESSURE: 72 MMHG | TEMPERATURE: 97.8 F | WEIGHT: 151 LBS | BODY MASS INDEX: 23.7 KG/M2 | OXYGEN SATURATION: 93 % | HEART RATE: 63 BPM | HEIGHT: 67 IN | SYSTOLIC BLOOD PRESSURE: 122 MMHG

## 2021-08-24 DIAGNOSIS — Z02.89 ENCOUNTER FOR COMPLETION OF FORM WITH PATIENT: ICD-10-CM

## 2021-08-24 PROCEDURE — 7101 PR PHYSICAL: Performed by: INTERNAL MEDICINE

## 2021-08-24 ASSESSMENT — FIBROSIS 4 INDEX: FIB4 SCORE: 1.6

## 2021-08-24 NOTE — PROGRESS NOTES
"    Established Patient    Lynne presents today with the following:    CC: [facility] health certification    HPI:   Lynne is a 89 y.o. female who came in for above.    She came in for   license renewal which will come up in one month for her 89th birthday.  She has been doing well. No more presyncope or syncope. She had eye exam yesterday.     ROS:   As above    Patient Active Problem List    Diagnosis Date Noted   • Chronic pain of left ankle 02/14/2020   • Venous insufficiency of both lower extremities 02/14/2020   • Skin lesion 02/14/2020   • Rosacea 05/15/2017   • Vitamin D insufficiency 03/23/2014   • Von Willebrand disease (HCC) 08/08/2012   • Hyperlipidemia 08/08/2012   • Essential hypertension 08/08/2012       Current Outpatient Medications   Medication Sig Dispense Refill   • atenolol (TENORMIN) 25 MG Tab TAKE ONE TABLET BY MOUTH TWICE A  tablet 1   • amLODIPine (NORVASC) 5 MG Tab TAKE ONE TABLET BY MOUTH EVERY EVENING *NORVASC* 100 tablet 1   • pravastatin (PRAVACHOL) 40 MG tablet TAKE ONE TABLET BY MOUTH EVERY EVENING *PRAVACHOL* 100 tablet 1   • vitamin D (CHOLECALCIFEROL) 1000 UNIT TABS Take 2,000 Units by mouth every day.       No current facility-administered medications for this visit.         /72 (BP Location: Left arm, Patient Position: Sitting)   Pulse 63   Temp 36.6 °C (97.8 °F)   Ht 1.702 m (5' 7\")   Wt 68.5 kg (151 lb)   LMP 10/01/1986   SpO2 93%   BMI 23.65 kg/m²     Physical Exam  General: Alert and oriented, No apparent distress.  Lungs: Clear to auscultation bilaterally without any wheezing, crepitations.  Cardiovascular: Regular rate and rhythm. No murmurs, rubs or gallops.  Abdomen: Bowel sound +, soft, non tender, no rebound or guarding, no palpable organomegaly  Extremities: No clubbing, cyanosis, edema.       Assessment and Plan    1. Encounter for completion of form with patient  Her previous syncope seemed to be situational and related to diarrhea. She no longer " has explosive diarrhea. She continues to have occasional mild loose stools depending on the diet, but has not given any presyncope or syncope.  - filled medical clearance to drive.      Return if symptoms worsen or fail to improve.         Signed by: Alexandra Warner M.D.

## 2021-08-26 ENCOUNTER — PATIENT MESSAGE (OUTPATIENT)
Dept: HEALTH INFORMATION MANAGEMENT | Facility: OTHER | Age: 86
End: 2021-08-26

## 2021-12-14 ENCOUNTER — APPOINTMENT (OUTPATIENT)
Dept: MEDICAL GROUP | Facility: MEDICAL CENTER | Age: 86
End: 2021-12-14
Payer: MEDICARE

## 2021-12-29 ENCOUNTER — TELEPHONE (OUTPATIENT)
Dept: MEDICAL GROUP | Facility: MEDICAL CENTER | Age: 86
End: 2021-12-29

## 2021-12-29 NOTE — TELEPHONE ENCOUNTER
ANNUAL WELLNESS VISIT PRE-VISIT PLANNING    Patient WAS NOT contacted to complete the Annual Wellness Visit Pre-Visit Planning. Information was reviewed in the chart.     1.  Reviewed notes from the last office visit: Yes    2.  If any orders were ordered or intended to be done prior to visit (i.e. 6 mos follow-up), do we have results/consult notes or has patient scheduled?   · Labs - Labs were not ordered at last office visit.   Note: If patient appointment is for lab review and patient did not complete labs, check with provider if OK to reschedule patient until labs completed.  · Imaging - Imaging was not ordered at last office visit.  · Referrals - No referrals were ordered at last office visit.    3.  Immunizations were updated in Epic using Reconcile Outside Information activity? Yes. Immunizations queried through Web-IZ and reconciled from outside sources. Immunization records are up to date.  · Is patient due for Tdap? NO  · Is patient due for Shingrix? YES. Patient was not notified of copay/out of pocket cost.     As of December 31 2021, [facility] will no longer be giving Shingles/Zoster vaccines. Please contact your local pharamcy to make an appointment to receive your immunization.    4.  Patient is due for the following Health Maintenance Topics:   Health Maintenance Due   Topic Date Due   • IMM ZOSTER VACCINES (2 of 3) 01/31/2011   • Annual Wellness Visit  10/02/2016     5.  Reviewed/Updated the following:  · Preferred Pharmacy? Yes  · Preferred Lab? Yes  · Preferred Communication? Yes  · Allergies? Yes; No new allergies to reconcile from outside sources.   · Medications? YES. Was Abstract Encounter opened and chart updated? NO; No new medications to reconcile from outside sources.  · Social History? Yes  · Family History (document living status of immediate family members and if + hx of cancer, diabetes, hypertension, hyperlipidemia, heart attack, stroke) No    6.  Care Team Updated:  · DME Company ([employer]  device, O2, CPAP, etc.): N\A  · Other Specialists (eye doctor, derm, GYN, cardiology, endo, etc): YES    7.  Patient was not advised: “This is a free wellness visit. The provider will screen for medical conditions to help you stay healthy. If you have other concerns to address you may be asked to discuss these at a separate visit or there may be an additional fee.”     8.  AHA (Puls[employer]) form printed for Provider? No, patient does not have any open alerts           This Pre-Visit Planning note has been created for the Provider and Medical Assistant to review prior to the patient's office appointment.   Patient is NOT REQUIRED to follow-up on this note.

## 2022-01-05 ENCOUNTER — TELEMEDICINE (OUTPATIENT)
Dept: MEDICAL GROUP | Facility: MEDICAL CENTER | Age: 87
End: 2022-01-05
Payer: MEDICARE

## 2022-01-05 VITALS
HEIGHT: 67 IN | SYSTOLIC BLOOD PRESSURE: 124 MMHG | WEIGHT: 146 LBS | DIASTOLIC BLOOD PRESSURE: 98 MMHG | TEMPERATURE: 97.3 F | BODY MASS INDEX: 22.91 KG/M2

## 2022-01-05 DIAGNOSIS — R42 LIGHTHEADEDNESS: ICD-10-CM

## 2022-01-05 DIAGNOSIS — R73.03 PREDIABETES: ICD-10-CM

## 2022-01-05 DIAGNOSIS — I10 ESSENTIAL HYPERTENSION: ICD-10-CM

## 2022-01-05 DIAGNOSIS — R26.89 BALANCE DISORDER: ICD-10-CM

## 2022-01-05 DIAGNOSIS — R19.7 DIARRHEA, UNSPECIFIED TYPE: ICD-10-CM

## 2022-01-05 DIAGNOSIS — E55.9 VITAMIN D INSUFFICIENCY: ICD-10-CM

## 2022-01-05 DIAGNOSIS — R29.898 LEFT LEG WEAKNESS: ICD-10-CM

## 2022-01-05 PROCEDURE — 99213 OFFICE O/P EST LOW 20 MIN: CPT | Mod: 95 | Performed by: INTERNAL MEDICINE

## 2022-01-05 RX ORDER — AMLODIPINE BESYLATE 2.5 MG/1
2.5 TABLET ORAL DAILY
Qty: 90 TABLET | Refills: 3 | Status: SHIPPED | OUTPATIENT
Start: 2022-01-05 | End: 2022-12-27 | Stop reason: SDUPTHER

## 2022-01-05 ASSESSMENT — PATIENT HEALTH QUESTIONNAIRE - PHQ9: CLINICAL INTERPRETATION OF PHQ2 SCORE: 0

## 2022-01-05 ASSESSMENT — FIBROSIS 4 INDEX: FIB4 SCORE: 1.62

## 2022-01-05 NOTE — PROGRESS NOTES
Chief Complaint   Patient presents with   • Loss Of Balance       HPI:  Lynne Dawkins is a 89 y.o. here for Medicare Annual Wellness Visit     Patient Active Problem List    Diagnosis Date Noted   • Chronic pain of left ankle 02/14/2020   • Venous insufficiency of both lower extremities 02/14/2020   • Skin lesion 02/14/2020   • Rosacea 05/15/2017   • Vitamin D insufficiency 03/23/2014   • Von Willebrand disease (HCC) 08/08/2012   • Hyperlipidemia 08/08/2012   • Essential hypertension 08/08/2012       Current Outpatient Medications   Medication Sig Dispense Refill   • atenolol (TENORMIN) 25 MG Tab TAKE ONE TABLET BY MOUTH TWICE A  tablet 1   • amLODIPine (NORVASC) 5 MG Tab TAKE ONE TABLET BY MOUTH EVERY EVENING *NORVASC* 100 tablet 1   • pravastatin (PRAVACHOL) 40 MG tablet TAKE ONE TABLET BY MOUTH EVERY EVENING *PRAVACHOL* 100 tablet 1   • vitamin D (CHOLECALCIFEROL) 1000 UNIT TABS Take 2,000 Units by mouth every day.       No current facility-administered medications for this visit.          Current supplements as per medication list.     Allergies: Aspirin and Iodine    Current social contact/activities: ***     She  reports that she has never smoked. She has never used smokeless tobacco. She reports current alcohol use of about 0.6 oz of alcohol per week. She reports that she does not use drugs.  Counseling given: Not Answered      DPA/Advanced Directive:  {MA ADVANCED DIRECTIVE:20335}    ROS:    Gait: Uses {DEVICE:09774}  Ostomy: {AWV YES / NO:21192}  Other tubes: {AWV YES / NO:21192}  Amputations: {AWV YES / NO:21192}  Chronic oxygen use: {AWV YES / NO:21192}  Last eye exam: ***  Wears hearing aids: {AWV YES / NO:21192}   : {Urinary leakage?:20840}    Screening:  ***  Depression Screening    Little interest or pleasure in doing things?  0 - not at all  Feeling down, depressed , or hopeless? 0 - not at all  Patient Health Questionnaire Score: 0     If depressive symptoms identified deferred to follow  up visit unless specifically addressed in assessment and plan.    Interpretation of PHQ-9 Total Score   Score Severity   1-4 No Depression   5-9 Mild Depression   10-14 Moderate Depression   15-19 Moderately Severe Depression   20-27 Severe Depression    Screening for Cognitive Impairment    Three Minute Recall (captain, garden, picture)  /3    Francisco clock face with all 12 numbers and set the hands to show 5 past 8.       Cognitive concerns identified deferred for follow up unless specifically addressed in assessment and plan.    Fall Risk Assessment    Has the patient had two or more falls in the last year or any fall with injury in the last year?  No    Safety Assessment    Throw rugs on floor.     Handrails on all stairs.     Good lighting in all hallways.     Difficulty hearing.     Patient counseled about all safety risks that were identified.    Functional Assessment ADLs    Are there any barriers preventing you from cooking for yourself or meeting nutritional needs?   .    Are there any barriers preventing you from driving safely or obtaining transportation?   .    Are there any barriers preventing you from using a telephone or calling for help?   .    Are there any barriers preventing you from shopping?   .    Are there any barriers preventing you from taking care of your own finances?   .    Are there any barriers preventing you from managing your medications?   .    Are there any barriers preventing you from showering, bathing or dressing yourself?   .    Are you currently engaging in any exercise or physical activity?   .     What is your perception of your health?   .      Health Maintenance Summary          Overdue - IMM ZOSTER VACCINES (2 of 3) Overdue since 1/31/2011 12/06/2010  Imm Admin: Zoster Vaccine Live (ZVL) (Zostavax) - HISTORICAL DATA          Overdue - Annual Wellness Visit (Every 366 Days) Overdue since 10/2/2016    10/02/2015  Visit Dx: Medicare annual wellness visit, subsequent           IMM DTaP/Tdap/Td Vaccine (3 - Td or Tdap) Next due on 6/16/2031 06/16/2021  Imm Admin: Tdap Vaccine    12/06/2010  Imm Admin: Tdap Vaccine          IMM PNEUMOCOCCAL VACCINE: 65+ Years (Series Information) Completed    10/02/2015  Imm Admin: Pneumococcal Conjugate Vaccine (Prevnar/PCV-13)    12/06/2010  Imm Admin: Pneumococcal polysaccharide vaccine (PPSV-23)          IMM INFLUENZA (Series Information) Completed    11/21/2021  Imm Admin: Influenza Vaccine Adult HD    11/19/2019  Imm Admin: Influenza Vaccine Adult HD    10/01/2018  Imm Admin: Influenza Vaccine Adult HD    10/24/2017  Imm Admin: Influenza Vaccine Adult HD    01/17/2017  Imm Admin: Influenza Vaccine Adult HD    Only the first 5 history entries have been loaded, but more history exists.          COVID-19 Vaccine (Series Information) Completed    11/21/2021  Imm Admin: Moderna SARS-CoV-2 Vaccine    03/01/2021  Imm Admin: Moderna SARS-CoV-2 Vaccine    02/01/2021  Imm Admin: Moderna SARS-CoV-2 Vaccine          IMM HEP B VACCINE (Series Information) Aged Out    No completion history exists for this topic.          IMM MENINGOCOCCAL VACCINE (MCV4) (Series Information) Aged Out    No completion history exists for this topic.          Discontinued - COLORECTAL CANCER SCREENING  Discontinued    No completion history exists for this topic.                Patient Care Team:  Alexandra Warner M.D. as PCP - General (Internal Medicine)  Zoe Mitchell M.D. as PCP - [facility] Paneled  Nnamdi Wells O.D. (Optometry)  Maureen Adhikari (Inactive)  Celso Gallego, PT as Physical Therapist (Physical Therapy)        Social History     Tobacco Use   • Smoking status: Never Smoker   • Smokeless tobacco: Never Used   Vaping Use   • Vaping Use: Never used   Substance Use Topics   • Alcohol use: Yes     Alcohol/week: 0.6 oz     Types: 1 Glasses of wine per week     Comment: 1/week   • Drug use: No     Family History   Problem Relation Age of Onset   • Cancer Mother         d 33 yo.  "Unk type.   • Heart Disease Sister         d 69   • Cancer Brother         d 73 brain cancer     She  has a past medical history of Bleeding disorder (HCC), HTN (hypertension), transfusion of packed red blood cells, Hyperlipidemia LDL goal < 100, Hypertension, and Vitamin D insufficiency (3/23/2014).   Past Surgical History:   Procedure Laterality Date   • CATARACT PHACO WITH IOL  7/16/2012    Performed by JEREL MACDONALD at [facility]   • CATARACT PHACO WITH IOL  7/2/2012    Performed by JEREL MACDONALD at [facility]   • HYSTERECTOMY, VAGINAL         Exam:   /98   Temp 36.3 °C (97.3 °F) (Temporal)   Ht 1.702 m (5' 7\")   Wt 66.2 kg (146 lb)  Body mass index is 22.87 kg/m².    Hearing {GOOD/FAIR/POOR/EXCELLENT:95308}.    Dentition {DENTITION:06616}  Alert, oriented in no acute distress.  Eye contact is good, speech goal directed, affect calm    Assessment and Plan. The following treatment and monitoring plan is recommended:  ***  There are no diagnoses linked to this encounter.    Services suggested: { AWV COORDINATION OF SERVICES:21837}  Health Care Screening: Age-appropriate preventive services recommended by USPTF and ACIP covered by Medicare were discussed today. Services ordered if indicated and agreed upon by the patient.  Referrals offered: Community-based lifestyle interventions to reduce health risks and promote self-management and wellness, fall prevention, nutrition, physical activity, tobacco-use cessation, weight loss, and mental health services as per orders if indicated.    Discussion today about general wellness and lifestyle habits:    · Prevent falls and reduce trip hazards; Cautioned about securing or removing rugs.  · Have a working fire alarm and carbon monoxide detector;   · Engage in regular physical activity and social activities     Follow-up: No follow-ups on file.    "

## 2022-01-06 NOTE — PROGRESS NOTES
"Virtual Visit: Established Patient   This visit was conducted via Zoom using secure and encrypted videoconferencing technology. The patient was in a private location in the state of [place].    The patient's identity was confirmed and verbal consent was obtained for this virtual visit.    Subjective:   CC:  Balance issue with walking, diarrhea, lightheadedness    HPI:   Lynne is a 89 y.o. female who came in for above.    She is doing well except for persistent intermittent symptoms above.  She has not had any syncope since last visit. She is having slight wobbliness while walking her dog for 2 miles every day.  She has chronic diarrhea and has intermittent lightheadedness.     ROS    As above    Allergies   Allergen Reactions   • Aspirin      VonWillebrand's Dz   • Iodine        Current medicines (including changes today)  Current Outpatient Medications   Medication Sig Dispense Refill   • amLODIPine (NORVASC) 2.5 MG Tab Take 1 Tablet by mouth every day. 90 Tablet 3   • atenolol (TENORMIN) 25 MG Tab TAKE ONE TABLET BY MOUTH TWICE A  tablet 1   • pravastatin (PRAVACHOL) 40 MG tablet TAKE ONE TABLET BY MOUTH EVERY EVENING *PRAVACHOL* 100 tablet 1   • vitamin D (CHOLECALCIFEROL) 1000 UNIT TABS Take 2,000 Units by mouth every day.       No current facility-administered medications for this visit.       Patient Active Problem List    Diagnosis Date Noted   • Prediabetes 01/05/2022   • Chronic pain of left ankle 02/14/2020   • Venous insufficiency of both lower extremities 02/14/2020   • Skin lesion 02/14/2020   • Rosacea 05/15/2017   • Vitamin D insufficiency 03/23/2014   • Von Willebrand disease (HCC) 08/08/2012   • Hyperlipidemia 08/08/2012   • Essential hypertension 08/08/2012          Objective:   /98   Temp 36.3 °C (97.3 °F) (Temporal)   Ht 1.702 m (5' 7\")   Wt 66.2 kg (146 lb)   LMP 10/01/1986   BMI 22.87 kg/m²     Physical Exam:   Constitutional: Alert, no distress, well-groomed.  Skin: No rashes " in visible areas.  Eye: Round. Conjunctiva clear, lids normal. No icterus.   ENMT: Lips pink without lesions, moist mucous membranes. Phonation normal.  Neck: No visible masses   Respiratory: Unlabored respiratory effort, no cough or audible wheeze  Psych: Alert and oriented x3, normal affect and mood.       Assessment and Plan:   The following treatment plan was discussed:     1. Diarrhea, unspecified type  She does not want to go through colonoscopy since she passed out during last one. She agreed to do blood draw. She is still feeling positive and satisfied with her life in general, so she does not want to go through invasive testing even if she ends up having cancer.  - CBC WITH DIFFERENTIAL; Future  - Comp Metabolic Panel; Future  - TSH WITH REFLEX TO FT4; Future  - VITAMIN B12; Future    2. Lightheadedness  - r/o aortic stenosis. EC-ECHOCARDIOGRAM COMPLETE W/O CONT; Future    3. Left leg weakness  4. Balance disorder  She had left leg pain since the injury before. This is contributing to her walking / balance problem. She tried PT before without improvement. Recommended reassessment. She will consider.  - VITAMIN B12; Future  - VITAMIN D,25 HYDROXY; Future    5. Essential hypertension  - lower amlodipine to half due to lightheadedness and low normal BP for her age. amLODIPine (NORVASC) 2.5 MG Tab; Take 1 Tablet by mouth every day.  Dispense: 90 Tablet; Refill: 3    6. Prediabetes  - HEMOGLOBIN A1C; Future    7. Vitamin D insufficiency  - VITAMIN D,25 HYDROXY; Future        Follow-up: Return in about 6 months (around 7/5/2022).

## 2022-02-02 ENCOUNTER — HOSPITAL ENCOUNTER (OUTPATIENT)
Dept: LAB | Facility: MEDICAL CENTER | Age: 87
End: 2022-02-02
Attending: INTERNAL MEDICINE
Payer: MEDICARE

## 2022-02-02 DIAGNOSIS — R19.7 DIARRHEA, UNSPECIFIED TYPE: ICD-10-CM

## 2022-02-02 DIAGNOSIS — R73.03 PREDIABETES: ICD-10-CM

## 2022-02-02 DIAGNOSIS — E78.5 DYSLIPIDEMIA: ICD-10-CM

## 2022-02-02 LAB
ALBUMIN SERPL BCP-MCNC: 4.7 G/DL (ref 3.2–4.9)
ALBUMIN/GLOB SERPL: 2 G/DL
ALP SERPL-CCNC: 110 U/L (ref 30–99)
ALT SERPL-CCNC: 9 U/L (ref 2–50)
ANION GAP SERPL CALC-SCNC: 13 MMOL/L (ref 7–16)
AST SERPL-CCNC: 16 U/L (ref 12–45)
BASOPHILS # BLD AUTO: 0.9 % (ref 0–1.8)
BASOPHILS # BLD: 0.06 K/UL (ref 0–0.12)
BILIRUB SERPL-MCNC: 0.5 MG/DL (ref 0.1–1.5)
BUN SERPL-MCNC: 11 MG/DL (ref 8–22)
CALCIUM SERPL-MCNC: 9.5 MG/DL (ref 8.5–10.5)
CHLORIDE SERPL-SCNC: 100 MMOL/L (ref 96–112)
CHOLEST SERPL-MCNC: 194 MG/DL (ref 100–199)
CO2 SERPL-SCNC: 25 MMOL/L (ref 20–33)
CREAT SERPL-MCNC: 0.73 MG/DL (ref 0.5–1.4)
EOSINOPHIL # BLD AUTO: 0.17 K/UL (ref 0–0.51)
EOSINOPHIL NFR BLD: 2.6 % (ref 0–6.9)
ERYTHROCYTE [DISTWIDTH] IN BLOOD BY AUTOMATED COUNT: 48.2 FL (ref 35.9–50)
EST. AVERAGE GLUCOSE BLD GHB EST-MCNC: 114 MG/DL
FASTING STATUS PATIENT QL REPORTED: NORMAL
GLOBULIN SER CALC-MCNC: 2.4 G/DL (ref 1.9–3.5)
GLUCOSE SERPL-MCNC: 75 MG/DL (ref 65–99)
HBA1C MFR BLD: 5.6 % (ref 4–5.6)
HCT VFR BLD AUTO: 43.4 % (ref 37–47)
HDLC SERPL-MCNC: 95 MG/DL
HGB BLD-MCNC: 13.5 G/DL (ref 12–16)
IMM GRANULOCYTES # BLD AUTO: 0.02 K/UL (ref 0–0.11)
IMM GRANULOCYTES NFR BLD AUTO: 0.3 % (ref 0–0.9)
LDLC SERPL CALC-MCNC: 79 MG/DL
LYMPHOCYTES # BLD AUTO: 1.59 K/UL (ref 1–4.8)
LYMPHOCYTES NFR BLD: 24.3 % (ref 22–41)
MCH RBC QN AUTO: 30.1 PG (ref 27–33)
MCHC RBC AUTO-ENTMCNC: 31.1 G/DL (ref 33.6–35)
MCV RBC AUTO: 96.9 FL (ref 81.4–97.8)
MONOCYTES # BLD AUTO: 0.63 K/UL (ref 0–0.85)
MONOCYTES NFR BLD AUTO: 9.6 % (ref 0–13.4)
NEUTROPHILS # BLD AUTO: 4.06 K/UL (ref 2–7.15)
NEUTROPHILS NFR BLD: 62.3 % (ref 44–72)
NRBC # BLD AUTO: 0 K/UL
NRBC BLD-RTO: 0 /100 WBC
PLATELET # BLD AUTO: 375 K/UL (ref 164–446)
PMV BLD AUTO: 10 FL (ref 9–12.9)
POTASSIUM SERPL-SCNC: 3.9 MMOL/L (ref 3.6–5.5)
PROT SERPL-MCNC: 7.1 G/DL (ref 6–8.2)
RBC # BLD AUTO: 4.48 M/UL (ref 4.2–5.4)
SODIUM SERPL-SCNC: 138 MMOL/L (ref 135–145)
TRIGL SERPL-MCNC: 98 MG/DL (ref 0–149)
TSH SERPL DL<=0.005 MIU/L-ACNC: 3.82 UIU/ML (ref 0.38–5.33)
WBC # BLD AUTO: 6.5 K/UL (ref 4.8–10.8)

## 2022-02-02 PROCEDURE — 36415 COLL VENOUS BLD VENIPUNCTURE: CPT

## 2022-02-02 PROCEDURE — 84443 ASSAY THYROID STIM HORMONE: CPT

## 2022-02-02 PROCEDURE — 80053 COMPREHEN METABOLIC PANEL: CPT

## 2022-02-02 PROCEDURE — 85025 COMPLETE CBC W/AUTO DIFF WBC: CPT

## 2022-02-02 PROCEDURE — 80061 LIPID PANEL: CPT

## 2022-02-02 PROCEDURE — 83036 HEMOGLOBIN GLYCOSYLATED A1C: CPT

## 2022-02-17 ENCOUNTER — OFFICE VISIT (OUTPATIENT)
Dept: URGENT CARE | Facility: CLINIC | Age: 87
End: 2022-02-17
Payer: MEDICARE

## 2022-02-17 ENCOUNTER — HOSPITAL ENCOUNTER (OUTPATIENT)
Facility: MEDICAL CENTER | Age: 87
End: 2022-02-17
Attending: NURSE PRACTITIONER
Payer: MEDICARE

## 2022-02-17 VITALS
OXYGEN SATURATION: 98 % | WEIGHT: 153 LBS | RESPIRATION RATE: 16 BRPM | DIASTOLIC BLOOD PRESSURE: 72 MMHG | BODY MASS INDEX: 24.01 KG/M2 | SYSTOLIC BLOOD PRESSURE: 140 MMHG | HEART RATE: 63 BPM | HEIGHT: 67 IN | TEMPERATURE: 97.8 F

## 2022-02-17 DIAGNOSIS — N30.01 ACUTE CYSTITIS WITH HEMATURIA: ICD-10-CM

## 2022-02-17 DIAGNOSIS — R35.0 URINARY FREQUENCY: ICD-10-CM

## 2022-02-17 DIAGNOSIS — R39.11 URINARY HESITANCY: ICD-10-CM

## 2022-02-17 LAB
APPEARANCE UR: NORMAL
BILIRUB UR STRIP-MCNC: NEGATIVE MG/DL
COLOR UR AUTO: YELLOW
GLUCOSE UR STRIP.AUTO-MCNC: NEGATIVE MG/DL
KETONES UR STRIP.AUTO-MCNC: NEGATIVE MG/DL
LEUKOCYTE ESTERASE UR QL STRIP.AUTO: NORMAL
NITRITE UR QL STRIP.AUTO: NEGATIVE
PH UR STRIP.AUTO: 6.5 [PH] (ref 5–8)
PROT UR QL STRIP: NEGATIVE MG/DL
RBC UR QL AUTO: NORMAL
SP GR UR STRIP.AUTO: 1
UROBILINOGEN UR STRIP-MCNC: 0.2 MG/DL

## 2022-02-17 PROCEDURE — 87186 SC STD MICRODIL/AGAR DIL: CPT

## 2022-02-17 PROCEDURE — 87077 CULTURE AEROBIC IDENTIFY: CPT

## 2022-02-17 PROCEDURE — 81002 URINALYSIS NONAUTO W/O SCOPE: CPT | Performed by: NURSE PRACTITIONER

## 2022-02-17 PROCEDURE — 99214 OFFICE O/P EST MOD 30 MIN: CPT | Performed by: NURSE PRACTITIONER

## 2022-02-17 PROCEDURE — 87086 URINE CULTURE/COLONY COUNT: CPT

## 2022-02-17 RX ORDER — CEFDINIR 300 MG/1
300 CAPSULE ORAL EVERY 12 HOURS
Qty: 10 CAPSULE | Refills: 0 | Status: SHIPPED | OUTPATIENT
Start: 2022-02-17 | End: 2022-02-22

## 2022-02-17 ASSESSMENT — ENCOUNTER SYMPTOMS
GASTROINTESTINAL NEGATIVE: 1
NEUROLOGICAL NEGATIVE: 1
EYES NEGATIVE: 1
RESPIRATORY NEGATIVE: 1
MUSCULOSKELETAL NEGATIVE: 1
PSYCHIATRIC NEGATIVE: 1
CARDIOVASCULAR NEGATIVE: 1
CONSTITUTIONAL NEGATIVE: 1

## 2022-02-17 ASSESSMENT — FIBROSIS 4 INDEX: FIB4 SCORE: 1.28

## 2022-02-17 NOTE — PROGRESS NOTES
"Subjective:   Lynne Dawkins is a 89 y.o. female who presents for Dysuria (2x days \"no stomach pain, just stings when I urinate\" ) and Urinary Frequency (2x days )       HPI  Pt presents for evaluation of a new problem, reports 2-day history of urinary frequency, hesitancy, burning, and pain.  Patient brought in a sample of urine due to dark coloration and concerned that she may be able to urinate upon arrival in clinic.  Patient does not have a history of recurrent UTIs, has not tried anything yet for symptoms reports [place] fever, chills, or other systemic symptoms at this time.    Review of Systems   Constitutional: Negative.    HENT: Negative.    Eyes: Negative.    Respiratory: Negative.    Cardiovascular: Negative.    Gastrointestinal: Negative.    Genitourinary: Positive for dysuria, frequency, hematuria and urgency.   Musculoskeletal: Negative.    Skin: Negative.    Neurological: Negative.    Endo/Heme/Allergies: Negative.    Psychiatric/Behavioral: Negative.    All other systems reviewed and are negative.      MEDS:   Current Outpatient Medications:   •  cefdinir (OMNICEF) 300 MG Cap, Take 1 Capsule by mouth every 12 hours for 5 days., Disp: 10 Capsule, Rfl: 0  •  amLODIPine (NORVASC) 2.5 MG Tab, Take 1 Tablet by mouth every day., Disp: 90 Tablet, Rfl: 3  •  atenolol (TENORMIN) 25 MG Tab, TAKE ONE TABLET BY MOUTH TWICE A DAY, Disp: 200 tablet, Rfl: 1  •  pravastatin (PRAVACHOL) 40 MG tablet, TAKE ONE TABLET BY MOUTH EVERY EVENING *PRAVACHOL*, Disp: 100 tablet, Rfl: 1  •  vitamin D (CHOLECALCIFEROL) 1000 UNIT TABS, Take 2,000 Units by mouth every day., Disp: , Rfl:   ALLERGIES:   Allergies   Allergen Reactions   • Aspirin      VonWillebrand's Dz   • Iodine        Patient's PMH, SocHx, SurgHx, FamHx, Drug allergies and medications were reviewed.     Objective:   /72 (BP Location: Right arm, Patient Position: Sitting, BP Cuff Size: Adult long)   Pulse 63   Temp 36.6 °C (97.8 °F) (Temporal)   Resp 16   " "Ht 1.702 m (5' 7\")   Wt 69.4 kg (153 lb)   LMP 10/01/1986   SpO2 98%   BMI 23.96 kg/m²     Physical Exam  Vitals and nursing note reviewed.   Constitutional:       General: She is awake.      Appearance: Normal appearance. She is well-developed.   HENT:      Head: Normocephalic and atraumatic.      Right Ear: External ear normal.      Left Ear: External ear normal.      Nose: Nose normal.      Mouth/Throat:      Mouth: Mucous membranes are moist.      Pharynx: Oropharynx is clear.   Eyes:      Extraocular Movements: Extraocular movements intact.      Conjunctiva/sclera: Conjunctivae normal.   Cardiovascular:      Rate and Rhythm: Normal rate and regular rhythm.      Heart sounds: Normal heart sounds.   Pulmonary:      Effort: Pulmonary effort is normal.      Breath sounds: Normal breath sounds.   Abdominal:      General: Bowel sounds are normal.      Palpations: Abdomen is soft.      Tenderness: There is no right CVA tenderness or left CVA tenderness.   Musculoskeletal:         General: Normal range of motion.      Cervical back: Normal range of motion and neck supple.   Skin:     General: Skin is warm and dry.   Neurological:      General: No focal deficit present.      Mental Status: She is alert and oriented to person, place, and time.   Psychiatric:         Mood and Affect: Mood normal.         Behavior: Behavior normal. Behavior is cooperative.         Thought Content: Thought content normal.         Judgment: Judgment normal.         Assessment/Plan:   Assessment    1. Acute cystitis with hematuria  - POCT Urinalysis  - Urine Culture; Future  - cefdinir (OMNICEF) 300 MG Cap; Take 1 Capsule by mouth every 12 hours for 5 days.  Dispense: 10 Capsule; Refill: 0    2. Urinary frequency  - POCT Urinalysis  - Urine Culture; Future    3. Urinary hesitancy  - POCT Urinalysis  - Urine Culture; Future    Vital signs stable at today's acute urgent care visit.  Reviewed test results that were completed in the clinic.  " Will send urine for culture.  Begin antibiotics as listed.  Recommend begin cranberry tablets as well as pushing fluids. Discussed management options (risks, benefits, and alternatives to treatment).     Advised the patient to follow-up with the primary care provider for recheck, reevaluation, and/or consideration of further management if necessary. Return to urgent care with any worsening symptoms or if there is no improvement in their current condition. Red flags discussed and indications to immediately call 911 or present to the Emergency Department.  All questions were encouraged and answered to the patient's satisfaction and understanding, and they agree to the plan of care.     I personally reviewed prior external notes and test results pertinent to today's visit.  I have independently reviewed and interpreted all diagnostics ordered during this urgent care acute visit. Time spent evaluating this patient was a minimum of 30 minutes and includes preparing for visit, counseling/education, exam and evaluation, obtaining history, independent interpretation and ordering lab/test/procedures.      Please note that this dictation was created using voice recognition software. I have made a reasonable attempt to correct obvious errors, but I expect that there are errors of grammar and possibly content that I did not discover before finalizing the note.

## 2022-02-20 LAB
BACTERIA UR CULT: ABNORMAL
BACTERIA UR CULT: ABNORMAL
SIGNIFICANT IND 70042: ABNORMAL
SITE SITE: ABNORMAL
SOURCE SOURCE: ABNORMAL

## 2022-02-25 ENCOUNTER — PATIENT MESSAGE (OUTPATIENT)
Dept: HEALTH INFORMATION MANAGEMENT | Facility: OTHER | Age: 87
End: 2022-02-25
Payer: MEDICARE

## 2022-05-03 ENCOUNTER — TELEPHONE (OUTPATIENT)
Dept: HEALTH INFORMATION MANAGEMENT | Facility: OTHER | Age: 87
End: 2022-05-03

## 2022-06-13 DIAGNOSIS — E78.00 PURE HYPERCHOLESTEROLEMIA: ICD-10-CM

## 2022-06-14 ENCOUNTER — TELEPHONE (OUTPATIENT)
Dept: MEDICAL GROUP | Facility: MEDICAL CENTER | Age: 87
End: 2022-06-14
Payer: MEDICARE

## 2022-06-14 RX ORDER — PRAVASTATIN SODIUM 40 MG
TABLET ORAL
Qty: 100 TABLET | Refills: 1 | Status: SHIPPED | OUTPATIENT
Start: 2022-06-14 | End: 2022-12-27 | Stop reason: SDUPTHER

## 2022-06-15 ENCOUNTER — HOSPITAL ENCOUNTER (OUTPATIENT)
Dept: LAB | Facility: MEDICAL CENTER | Age: 87
End: 2022-06-15
Attending: INTERNAL MEDICINE
Payer: MEDICARE

## 2022-06-15 DIAGNOSIS — R19.7 DIARRHEA, UNSPECIFIED TYPE: ICD-10-CM

## 2022-06-15 DIAGNOSIS — R73.03 PREDIABETES: ICD-10-CM

## 2022-06-15 DIAGNOSIS — R26.89 BALANCE DISORDER: ICD-10-CM

## 2022-06-15 LAB
ALBUMIN SERPL BCP-MCNC: 4.1 G/DL (ref 3.2–4.9)
ALBUMIN/GLOB SERPL: 1.8 G/DL
ALP SERPL-CCNC: 115 U/L (ref 30–99)
ALT SERPL-CCNC: 11 U/L (ref 2–50)
ANION GAP SERPL CALC-SCNC: 11 MMOL/L (ref 7–16)
AST SERPL-CCNC: 17 U/L (ref 12–45)
BASOPHILS # BLD AUTO: 0.8 % (ref 0–1.8)
BASOPHILS # BLD: 0.04 K/UL (ref 0–0.12)
BILIRUB SERPL-MCNC: 0.4 MG/DL (ref 0.1–1.5)
BUN SERPL-MCNC: 12 MG/DL (ref 8–22)
CALCIUM SERPL-MCNC: 9.1 MG/DL (ref 8.4–10.2)
CHLORIDE SERPL-SCNC: 101 MMOL/L (ref 96–112)
CO2 SERPL-SCNC: 27 MMOL/L (ref 20–33)
CREAT SERPL-MCNC: 0.71 MG/DL (ref 0.5–1.4)
EOSINOPHIL # BLD AUTO: 0.19 K/UL (ref 0–0.51)
EOSINOPHIL NFR BLD: 3.6 % (ref 0–6.9)
ERYTHROCYTE [DISTWIDTH] IN BLOOD BY AUTOMATED COUNT: 45.1 FL (ref 35.9–50)
EST. AVERAGE GLUCOSE BLD GHB EST-MCNC: 108 MG/DL
FASTING STATUS PATIENT QL REPORTED: NORMAL
GFR SERPLBLD CREATININE-BSD FMLA CKD-EPI: 80 ML/MIN/1.73 M 2
GLOBULIN SER CALC-MCNC: 2.3 G/DL (ref 1.9–3.5)
GLUCOSE SERPL-MCNC: 87 MG/DL (ref 65–99)
HBA1C MFR BLD: 5.4 % (ref 4–5.6)
HCT VFR BLD AUTO: 40.6 % (ref 37–47)
HGB BLD-MCNC: 12.9 G/DL (ref 12–16)
IMM GRANULOCYTES # BLD AUTO: 0.02 K/UL (ref 0–0.11)
IMM GRANULOCYTES NFR BLD AUTO: 0.4 % (ref 0–0.9)
LYMPHOCYTES # BLD AUTO: 1.51 K/UL (ref 1–4.8)
LYMPHOCYTES NFR BLD: 28.4 % (ref 22–41)
MCH RBC QN AUTO: 30.1 PG (ref 27–33)
MCHC RBC AUTO-ENTMCNC: 31.8 G/DL (ref 33.6–35)
MCV RBC AUTO: 94.9 FL (ref 81.4–97.8)
MONOCYTES # BLD AUTO: 0.54 K/UL (ref 0–0.85)
MONOCYTES NFR BLD AUTO: 10.2 % (ref 0–13.4)
NEUTROPHILS # BLD AUTO: 3.02 K/UL (ref 2–7.15)
NEUTROPHILS NFR BLD: 56.6 % (ref 44–72)
NRBC # BLD AUTO: 0 K/UL
NRBC BLD-RTO: 0 /100 WBC
PLATELET # BLD AUTO: 275 K/UL (ref 164–446)
PMV BLD AUTO: 10 FL (ref 9–12.9)
POTASSIUM SERPL-SCNC: 4.2 MMOL/L (ref 3.6–5.5)
PROT SERPL-MCNC: 6.4 G/DL (ref 6–8.2)
RBC # BLD AUTO: 4.28 M/UL (ref 4.2–5.4)
SODIUM SERPL-SCNC: 139 MMOL/L (ref 135–145)
TSH SERPL DL<=0.005 MIU/L-ACNC: 3.42 UIU/ML (ref 0.38–5.33)
VIT B12 SERPL-MCNC: 411 PG/ML (ref 211–911)
WBC # BLD AUTO: 5.3 K/UL (ref 4.8–10.8)

## 2022-06-15 PROCEDURE — 82607 VITAMIN B-12: CPT

## 2022-06-15 PROCEDURE — 83036 HEMOGLOBIN GLYCOSYLATED A1C: CPT

## 2022-06-15 PROCEDURE — 84443 ASSAY THYROID STIM HORMONE: CPT

## 2022-06-15 PROCEDURE — 80053 COMPREHEN METABOLIC PANEL: CPT

## 2022-06-15 PROCEDURE — 36415 COLL VENOUS BLD VENIPUNCTURE: CPT

## 2022-06-15 PROCEDURE — 85025 COMPLETE CBC W/AUTO DIFF WBC: CPT

## 2022-10-24 NOTE — PROGRESS NOTES
Lynne Dawkins is a 86 y.o. female here for a non-provider visit for: Lab Draws  on 4/19/2018 at 2:12 PM    Procedure Performed: Venipuncture     Anatomical site: Right Antecubital Area (AC)    Equipment used: 21g    Labs drawn: Von Willebrand Multimeric, Von Willebrand's Profile    Ordering Provider: Dr. Neil Kang    Francisco By: Genevieve Tellez, Med Ass't  No complications and  was present in the office the entire time that I was doing the patients blood draw.    
The patient is a 6y3m Female complaining of fever.

## 2022-12-27 ENCOUNTER — HOSPITAL ENCOUNTER (OUTPATIENT)
Dept: LAB | Facility: MEDICAL CENTER | Age: 87
End: 2022-12-27
Attending: INTERNAL MEDICINE
Payer: MEDICARE

## 2022-12-27 DIAGNOSIS — E78.00 PURE HYPERCHOLESTEROLEMIA: ICD-10-CM

## 2022-12-27 DIAGNOSIS — I10 ESSENTIAL HYPERTENSION: ICD-10-CM

## 2022-12-27 RX ORDER — PRAVASTATIN SODIUM 40 MG
TABLET ORAL
Qty: 100 TABLET | Refills: 3 | Status: SHIPPED | OUTPATIENT
Start: 2022-12-27 | End: 2023-02-14 | Stop reason: SDUPTHER

## 2022-12-27 RX ORDER — AMLODIPINE BESYLATE 2.5 MG/1
TABLET ORAL
Qty: 100 TABLET | Refills: 3 | Status: SHIPPED | OUTPATIENT
Start: 2022-12-27 | End: 2023-02-14 | Stop reason: SDUPTHER

## 2022-12-29 ENCOUNTER — TELEPHONE (OUTPATIENT)
Dept: MEDICAL GROUP | Facility: MEDICAL CENTER | Age: 87
End: 2022-12-29
Payer: MEDICARE

## 2022-12-29 NOTE — TELEPHONE ENCOUNTER
Rec'd Teams message that pt was upset because she went to the lab and there were no Lab Orders for her. Looked in chart and there is nothing pending. Please address and notify patient.

## 2022-12-30 NOTE — TELEPHONE ENCOUNTER
I did not see any communication from her for requesting lab. I did not say anything about needing lab after her last lab result note. She just need to come in for her wellness visit.

## 2023-02-14 ENCOUNTER — OFFICE VISIT (OUTPATIENT)
Dept: MEDICAL GROUP | Facility: MEDICAL CENTER | Age: 88
End: 2023-02-14
Payer: MEDICARE

## 2023-02-14 VITALS
BODY MASS INDEX: 23.46 KG/M2 | SYSTOLIC BLOOD PRESSURE: 134 MMHG | DIASTOLIC BLOOD PRESSURE: 80 MMHG | HEART RATE: 64 BPM | RESPIRATION RATE: 16 BRPM | HEIGHT: 66 IN | OXYGEN SATURATION: 95 % | TEMPERATURE: 98.2 F | WEIGHT: 146 LBS

## 2023-02-14 DIAGNOSIS — R73.03 PREDIABETES: ICD-10-CM

## 2023-02-14 DIAGNOSIS — M54.2 NECK PAIN: ICD-10-CM

## 2023-02-14 DIAGNOSIS — K52.9 CHRONIC DIARRHEA: ICD-10-CM

## 2023-02-14 DIAGNOSIS — Z78.0 POST-MENOPAUSE: ICD-10-CM

## 2023-02-14 DIAGNOSIS — Z00.00 ENCOUNTER FOR MEDICARE ANNUAL WELLNESS EXAM: ICD-10-CM

## 2023-02-14 DIAGNOSIS — D68.00 VON WILLEBRAND DISEASE (HCC): ICD-10-CM

## 2023-02-14 DIAGNOSIS — R63.4 WEIGHT LOSS: ICD-10-CM

## 2023-02-14 DIAGNOSIS — E78.00 PURE HYPERCHOLESTEROLEMIA: ICD-10-CM

## 2023-02-14 DIAGNOSIS — E55.9 VITAMIN D INSUFFICIENCY: ICD-10-CM

## 2023-02-14 DIAGNOSIS — I10 ESSENTIAL HYPERTENSION: ICD-10-CM

## 2023-02-14 PROBLEM — L98.9 SKIN LESION: Status: RESOLVED | Noted: 2020-02-14 | Resolved: 2023-02-14

## 2023-02-14 PROCEDURE — G0439 PPPS, SUBSEQ VISIT: HCPCS | Performed by: INTERNAL MEDICINE

## 2023-02-14 RX ORDER — AMLODIPINE BESYLATE 2.5 MG/1
2.5 TABLET ORAL DAILY
Qty: 100 TABLET | Refills: 3 | Status: SHIPPED | OUTPATIENT
Start: 2023-02-14 | End: 2024-01-26

## 2023-02-14 RX ORDER — ATENOLOL 25 MG/1
25 TABLET ORAL 2 TIMES DAILY
Qty: 180 TABLET | Refills: 3 | Status: SHIPPED | OUTPATIENT
Start: 2023-02-14 | End: 2024-02-16 | Stop reason: SDUPTHER

## 2023-02-14 RX ORDER — PRAVASTATIN SODIUM 40 MG
TABLET ORAL
Qty: 100 TABLET | Refills: 3 | Status: SHIPPED | OUTPATIENT
Start: 2023-02-14 | End: 2024-02-16 | Stop reason: SDUPTHER

## 2023-02-14 ASSESSMENT — ENCOUNTER SYMPTOMS: GENERAL WELL-BEING: EXCELLENT

## 2023-02-14 ASSESSMENT — ACTIVITIES OF DAILY LIVING (ADL): BATHING_REQUIRES_ASSISTANCE: 0

## 2023-02-14 ASSESSMENT — FIBROSIS 4 INDEX: FIB4 SCORE: 1.7

## 2023-02-14 ASSESSMENT — PATIENT HEALTH QUESTIONNAIRE - PHQ9: CLINICAL INTERPRETATION OF PHQ2 SCORE: 0

## 2023-02-14 NOTE — PROGRESS NOTES
Chief Complaint   Patient presents with    Annual Wellness Visit    Diarrhea     Last week get diarrhea  Occurring about every 3-4 months  If possible to have some kind of medication on hand to start treat when it comes again.    Arthritis     neck    Other     Request more blood work for specific medication to see how it works       HPI:  Lynne Dawkins is a 89 y.o. here for Medicare Annual Wellness Visit     She has been doing well. She has no acute concern.     She is wondering if she can get medication for chronic intermittent diarrhea for symptom control.    Patient Active Problem List    Diagnosis Date Noted    Prediabetes 01/05/2022    Chronic pain of left ankle 02/14/2020    Venous insufficiency of both lower extremities 02/14/2020    Rosacea 05/15/2017    Vitamin D insufficiency 03/23/2014    Von Willebrand disease 08/08/2012    Hyperlipidemia 08/08/2012    Essential hypertension 08/08/2012       Current Outpatient Medications   Medication Sig Dispense Refill    amLODIPine (NORVASC) 2.5 MG Tab Take 1 Tablet by mouth every day. 100 Tablet 3    pravastatin (PRAVACHOL) 40 MG tablet TAKE 1 TABLET BY MOUTH EVERY EVENING.(PRAVACHOL) 100 Tablet 3    atenolol (TENORMIN) 25 MG Tab Take 1 Tablet by mouth 2 times a day. 180 Tablet 3    vitamin D (CHOLECALCIFEROL) 1000 UNIT TABS Take 2,000 Units by mouth every day.       No current facility-administered medications for this visit.          Current supplements as per medication list.     Allergies: Aspirin and Iodine    Current social contact/activities: Painting, writing, gardening    She  reports that she has never smoked. She has never used smokeless tobacco. She reports current alcohol use of about 0.6 oz per week. She reports that she does not use drugs.  Counseling given: Not Answered      ROS:    Gait: Uses no assistive device  Ostomy: No  Other tubes: No  Amputations: No  Chronic oxygen use: No  Last eye exam: 2022  Wears hearing aids: No   : Reports urinary  leakage during the last 6 months that has not interfered at all with their daily activities or sleep.    Screening:    Depression Screening  Little interest or pleasure in doing things?  0 - not at all  Feeling down, depressed , or hopeless? 0 - not at all  Patient Health Questionnaire Score: 0     If depressive symptoms identified deferred to follow up visit unless specifically addressed in assessment and plan.    Interpretation of PHQ-9 Total Score   Score Severity   1-4 No Depression   5-9 Mild Depression   10-14 Moderate Depression   15-19 Moderately Severe Depression   20-27 Severe Depression    Screening for Cognitive Impairment  Three Minute Recall (daughter, heaven, mountain) 3/3    Francisco clock face with all 12 numbers and set the hands to show 10 past 11.  Yes    Cognitive concerns identified deferred for follow up unless specifically addressed in assessment and plan.    Fall Risk Assessment  Has the patient had two or more falls in the last year or any fall with injury in the last year?  No    Safety Assessment  Throw rugs on floor.  Yes  Handrails on all stairs.  Yes  Good lighting in all hallways.  Yes  Difficulty hearing.  No  Patient counseled about all safety risks that were identified.    Functional Assessment ADLs  Are there any barriers preventing you from cooking for yourself or meeting nutritional needs?  No.    Are there any barriers preventing you from driving safely or obtaining transportation?  No.    Are there any barriers preventing you from using a telephone or calling for help?  No.    Are there any barriers preventing you from shopping?  No.    Are there any barriers preventing you from taking care of your own finances?  No.    Are there any barriers preventing you from managing your medications?  No.    Are there any barriers preventing you from showering, bathing or dressing yourself?  No.    Are you currently engaging in any exercise or physical activity?  Yes.  Walking 1 1/5  miles  What is your perception of your health?  Excellent    Advance Care Planning  Do you have an Advance Directive, Living Will, Durable Power of , or POLST? Yes  Advance Directive Living Will Durable Power of  POLST is not on file - instructed patient to bring in a copy to scan into their chart      Health Maintenance Summary            Overdue - COVID-19 Vaccine (5 - Booster for Moderna series) Overdue since 7/17/2022 05/22/2022  Imm Admin: PFIZER ELLIS CAP SARS-COV-2 VACCINATION (12+)    11/21/2021  Imm Admin: MODERNA SARS-COV-2 VACCINE (12+)    03/01/2021  Imm Admin: MODERNA SARS-COV-2 VACCINE (12+)    02/01/2021  Imm Admin: MODERNA SARS-COV-2 VACCINE (12+)              Annual Wellness Visit (Every 366 Days) Next due on 2/15/2024      02/14/2023  Visit Dx: Encounter for Medicare annual wellness exam    10/02/2015  Visit Dx: Medicare annual wellness visit, subsequent              IMM DTaP/Tdap/Td Vaccine (3 - Td or Tdap) Next due on 6/16/2031 06/16/2021  Imm Admin: Tdap Vaccine    12/06/2010  Imm Admin: Tdap Vaccine              IMM PNEUMOCOCCAL VACCINE: 65+ Years (Series Information) Completed      10/02/2015  Imm Admin: Pneumococcal Conjugate Vaccine (Prevnar/PCV-13)    12/06/2010  Imm Admin: Pneumococcal polysaccharide vaccine (PPSV-23)              IMM INFLUENZA (Series Information) Completed      10/28/2022  Imm Admin: Influenza Vaccine Adult HD    11/21/2021  Imm Admin: Influenza Vaccine Adult HD    11/19/2019  Imm Admin: Influenza Vaccine Adult HD    10/01/2018  Imm Admin: Influenza Vaccine Adult HD    10/24/2017  Imm Admin: Influenza Vaccine Adult HD    Only the first 5 history entries have been loaded, but more history exists.              IMM HEP B VACCINE (Series Information) Aged Out      No completion history exists for this topic.              IMM MENINGOCOCCAL ACWY VACCINE (Series Information) Aged Out      No completion history exists for this topic.               "Discontinued - BONE DENSITY  Discontinued      No completion history exists for this topic.              Discontinued - IMM ZOSTER VACCINES  Discontinued      12/06/2010  Imm Admin: Zoster Vaccine Live (ZVL) (Zostavax) - HISTORICAL DATA                    Patient Care Team:  Alexandra Warner M.D. as PCP - General (Internal Medicine)  Zoe Mitchell M.D. as PCP - [facility] Paneled  Nnamdi Wells O.D. (Optometry)  Maureen Adhikari (Inactive)  Celso Gallego, PT as Physical Therapist (Physical Therapy)        Social History     Tobacco Use    Smoking status: Never    Smokeless tobacco: Never   Vaping Use    Vaping Use: Never used   Substance Use Topics    Alcohol use: Yes     Alcohol/week: 0.6 oz     Types: 1 Glasses of wine per week     Comment: 1/week    Drug use: No     Family History   Problem Relation Age of Onset    Cancer Mother         d 33 yo. Unk type.    Heart Disease Sister         d 69    Cancer Brother         d 73 brain cancer     She  has a past medical history of Bleeding disorder (HCC), HTN (hypertension), transfusion of packed red blood cells, Hyperlipidemia LDL goal < 100, Hypertension, and Vitamin D insufficiency (3/23/2014).   Past Surgical History:   Procedure Laterality Date    CATARACT PHACO WITH IOL  7/16/2012    Performed by [facility]DON[facility]D at [facility]    CATARACT PHACO WITH IOL  7/2/2012    Performed by [facility]D at [facility]    HYSTERECTOMY, VAGINAL         Exam:   /80 (BP Location: Left arm, Patient Position: Sitting)   Pulse 64   Temp 36.8 °C (98.2 °F) (Temporal)   Resp 16   Ht 1.676 m (5' 6\")   Wt 66.2 kg (146 lb)   SpO2 95%  Body mass index is 23.57 kg/m².    Hearing excellent.    Dentition good  Alert, oriented in no acute distress.  Eye contact is good, speech goal directed, affect calm    Assessment and Plan. The following treatment and monitoring plan is recommended:     1. Encounter for Medicare annual wellness exam    2. Prediabetes  -Has " been stable.  [place] HEMOGLOBIN A1C; Future    3. Essential hypertension  Controlled.  - amLODIPine (NORVASC) 2.5 MG Tab; Take 1 Tablet by mouth every day.  Dispense: 100 Tablet; Refill: 3  - atenolol (TENORMIN) 25 MG Tab; Take 1 Tablet by mouth 2 times a day.  Dispense: 180 Tablet; Refill: 3    4. Pure hypercholesterolemia  Controlled.  - Comp Metabolic Panel; Future  - Lipid Profile; Future  - pravastatin (PRAVACHOL) 40 MG tablet; TAKE 1 TABLET BY MOUTH EVERY EVENING.(PRAVACHOL)  Dispense: 100 Tablet; Refill: 3    5. Von Willebrand disease  F/b heme  - CBC WITH DIFFERENTIAL; Future    6. Vitamin D insufficiency  On OTC supplement  - VITAMIN D,25 HYDROXY (DEFICIENCY); Future    7. Post-menopause  - declined dexa.    8. Neck pain  - occasional pain in cervical spine, radiating to right occipital.  Manageable with topical diclofenac.  Recommended home spine exercises.    9. Chronic diarrhea  Watery diarrhea without blood every few months.  So far, Imodium is helping.  So far, she has contributed this to aging and organs gradually failing.  This previously led to syncope one time, but no more syncope recently. Discussed that in order to treat specifically, we have to figure out etiology of diarrhea. She is not interested in colonoscopy despite the chance of missing colon cancer diagnosis as she is not interested in cancer treatment at this age.  [place] decision to proceed with stool test to figure out etiology at our best.   - Cdiff By PCR Rflx Toxin; Future  - CULTURE STOOL; Future  - OCCULT BLOOD FECES IMMUNOASSAY; Future  - FECAL LACTOFERRIN QUALITATIVE; Future  - CRYPTO/GIARDIA RAPID ASSAY; Future  - BODY FLUID FAT; Future  - PANCREATIC ELASTASE, FECAL; Future    10. Weight loss  7 pound loss over 1 year, < 5% of baseline weight.  No appetite loss. Is eating slightly less, which she has contributed to aging.  Clinically monitor as she like to avoid aggressive investigation/intervention.    Declined COVID  booster    Services suggested: No services needed at this time  Health Care Screening: Age-appropriate preventive services recommended by USPTF and ACIP covered by Medicare were discussed today. Services ordered if indicated and agreed upon by the patient.  Referrals offered: Community-based lifestyle interventions to reduce health risks and promote self-management and wellness, fall prevention, nutrition, physical activity, tobacco-use cessation, weight loss, and mental health services as per orders if indicated.    Discussion today about general wellness and lifestyle habits:    Prevent falls and reduce trip hazards; Cautioned about securing or removing rugs.  Have a working fire alarm and carbon monoxide detector;   Engage in regular physical activity and social activities     Follow-up: Return in about 1 year (around 2/14/2024) for Annual wellness visit.

## 2023-03-24 ENCOUNTER — HOSPITAL ENCOUNTER (OUTPATIENT)
Dept: LAB | Facility: MEDICAL CENTER | Age: 88
End: 2023-03-24
Attending: INTERNAL MEDICINE
Payer: MEDICARE

## 2023-03-24 DIAGNOSIS — R73.03 PREDIABETES: ICD-10-CM

## 2023-03-24 DIAGNOSIS — D68.00 VON WILLEBRAND DISEASE (HCC): ICD-10-CM

## 2023-03-24 DIAGNOSIS — E78.00 PURE HYPERCHOLESTEROLEMIA: ICD-10-CM

## 2023-03-24 DIAGNOSIS — E55.9 VITAMIN D INSUFFICIENCY: ICD-10-CM

## 2023-03-24 LAB
25(OH)D3 SERPL-MCNC: 37 NG/ML (ref 30–100)
ALBUMIN SERPL BCP-MCNC: 4.2 G/DL (ref 3.2–4.9)
ALBUMIN/GLOB SERPL: 1.8 G/DL
ALP SERPL-CCNC: 104 U/L (ref 30–99)
ALT SERPL-CCNC: 12 U/L (ref 2–50)
ANION GAP SERPL CALC-SCNC: 9 MMOL/L (ref 7–16)
AST SERPL-CCNC: 15 U/L (ref 12–45)
BASOPHILS # BLD AUTO: 0.7 % (ref 0–1.8)
BASOPHILS # BLD: 0.05 K/UL (ref 0–0.12)
BILIRUB SERPL-MCNC: 0.5 MG/DL (ref 0.1–1.5)
BUN SERPL-MCNC: 13 MG/DL (ref 8–22)
CALCIUM ALBUM COR SERPL-MCNC: 8.7 MG/DL (ref 8.5–10.5)
CALCIUM SERPL-MCNC: 8.9 MG/DL (ref 8.4–10.2)
CHLORIDE SERPL-SCNC: 103 MMOL/L (ref 96–112)
CHOLEST SERPL-MCNC: 194 MG/DL (ref 100–199)
CO2 SERPL-SCNC: 28 MMOL/L (ref 20–33)
CREAT SERPL-MCNC: 0.68 MG/DL (ref 0.5–1.4)
EOSINOPHIL # BLD AUTO: 0.16 K/UL (ref 0–0.51)
EOSINOPHIL NFR BLD: 2.4 % (ref 0–6.9)
ERYTHROCYTE [DISTWIDTH] IN BLOOD BY AUTOMATED COUNT: 44.3 FL (ref 35.9–50)
EST. AVERAGE GLUCOSE BLD GHB EST-MCNC: 111 MG/DL
FASTING STATUS PATIENT QL REPORTED: NORMAL
GFR SERPLBLD CREATININE-BSD FMLA CKD-EPI: 82 ML/MIN/1.73 M 2
GLOBULIN SER CALC-MCNC: 2.3 G/DL (ref 1.9–3.5)
GLUCOSE SERPL-MCNC: 89 MG/DL (ref 65–99)
HBA1C MFR BLD: 5.5 % (ref 4–5.6)
HCT VFR BLD AUTO: 41.3 % (ref 37–47)
HDLC SERPL-MCNC: 99 MG/DL
HGB BLD-MCNC: 13.4 G/DL (ref 12–16)
IMM GRANULOCYTES # BLD AUTO: 0.02 K/UL (ref 0–0.11)
IMM GRANULOCYTES NFR BLD AUTO: 0.3 % (ref 0–0.9)
LDLC SERPL CALC-MCNC: 81 MG/DL
LYMPHOCYTES # BLD AUTO: 1.53 K/UL (ref 1–4.8)
LYMPHOCYTES NFR BLD: 22.7 % (ref 22–41)
MCH RBC QN AUTO: 30.5 PG (ref 27–33)
MCHC RBC AUTO-ENTMCNC: 32.4 G/DL (ref 33.6–35)
MCV RBC AUTO: 94.1 FL (ref 81.4–97.8)
MONOCYTES # BLD AUTO: 0.59 K/UL (ref 0–0.85)
MONOCYTES NFR BLD AUTO: 8.8 % (ref 0–13.4)
NEUTROPHILS # BLD AUTO: 4.39 K/UL (ref 2–7.15)
NEUTROPHILS NFR BLD: 65.1 % (ref 44–72)
NRBC # BLD AUTO: 0 K/UL
NRBC BLD-RTO: 0 /100 WBC
PLATELET # BLD AUTO: 293 K/UL (ref 164–446)
PMV BLD AUTO: 9.7 FL (ref 9–12.9)
POTASSIUM SERPL-SCNC: 4.1 MMOL/L (ref 3.6–5.5)
PROT SERPL-MCNC: 6.5 G/DL (ref 6–8.2)
RBC # BLD AUTO: 4.39 M/UL (ref 4.2–5.4)
SODIUM SERPL-SCNC: 140 MMOL/L (ref 135–145)
TRIGL SERPL-MCNC: 72 MG/DL (ref 0–149)
WBC # BLD AUTO: 6.7 K/UL (ref 4.8–10.8)

## 2023-03-24 PROCEDURE — 85025 COMPLETE CBC W/AUTO DIFF WBC: CPT

## 2023-03-24 PROCEDURE — 82306 VITAMIN D 25 HYDROXY: CPT

## 2023-03-24 PROCEDURE — 80053 COMPREHEN METABOLIC PANEL: CPT

## 2023-03-24 PROCEDURE — 83036 HEMOGLOBIN GLYCOSYLATED A1C: CPT

## 2023-03-24 PROCEDURE — 80061 LIPID PANEL: CPT

## 2023-03-24 PROCEDURE — 36415 COLL VENOUS BLD VENIPUNCTURE: CPT

## 2023-05-24 ENCOUNTER — TELEPHONE (OUTPATIENT)
Dept: HEALTH INFORMATION MANAGEMENT | Facility: OTHER | Age: 88
End: 2023-05-24
Payer: MEDICARE

## 2024-01-22 DIAGNOSIS — I10 ESSENTIAL HYPERTENSION: ICD-10-CM

## 2024-01-25 NOTE — TELEPHONE ENCOUNTER
Received request via: Pharmacy    Was the patient seen in the last year in this department? Yes    Does the patient have an active prescription (recently filled or refills available) for medication(s) requested? No    [facility]      Does the patient have half-way Plus and need 100 day supply (blood pressure, diabetes and cholesterol meds only)? Yes, quantity updated to 100 days

## 2024-01-26 RX ORDER — AMLODIPINE BESYLATE 2.5 MG/1
2.5 TABLET ORAL DAILY
Qty: 100 TABLET | Refills: 3 | Status: SHIPPED | OUTPATIENT
Start: 2024-01-26 | End: 2024-02-16 | Stop reason: SDUPTHER

## 2024-02-08 ENCOUNTER — HOSPITAL ENCOUNTER (OUTPATIENT)
Dept: LAB | Facility: MEDICAL CENTER | Age: 89
End: 2024-02-08
Attending: INTERNAL MEDICINE
Payer: MEDICARE

## 2024-02-16 ENCOUNTER — OFFICE VISIT (OUTPATIENT)
Dept: MEDICAL GROUP | Facility: MEDICAL CENTER | Age: 89
End: 2024-02-16
Payer: MEDICARE

## 2024-02-16 VITALS
HEIGHT: 66 IN | TEMPERATURE: 97.6 F | DIASTOLIC BLOOD PRESSURE: 70 MMHG | WEIGHT: 152 LBS | OXYGEN SATURATION: 98 % | SYSTOLIC BLOOD PRESSURE: 140 MMHG | BODY MASS INDEX: 24.43 KG/M2 | HEART RATE: 61 BPM

## 2024-02-16 DIAGNOSIS — J00 ACUTE RHINITIS: ICD-10-CM

## 2024-02-16 DIAGNOSIS — I10 ESSENTIAL HYPERTENSION: ICD-10-CM

## 2024-02-16 DIAGNOSIS — Z00.00 ENCOUNTER FOR MEDICARE ANNUAL WELLNESS EXAM: ICD-10-CM

## 2024-02-16 DIAGNOSIS — R63.5 WEIGHT GAIN: ICD-10-CM

## 2024-02-16 DIAGNOSIS — E55.9 VITAMIN D INSUFFICIENCY: ICD-10-CM

## 2024-02-16 DIAGNOSIS — R73.03 PREDIABETES: ICD-10-CM

## 2024-02-16 DIAGNOSIS — D68.00 VON WILLEBRAND DISEASE (HCC): ICD-10-CM

## 2024-02-16 DIAGNOSIS — E78.00 PURE HYPERCHOLESTEROLEMIA: ICD-10-CM

## 2024-02-16 PROCEDURE — G0439 PPPS, SUBSEQ VISIT: HCPCS | Performed by: INTERNAL MEDICINE

## 2024-02-16 PROCEDURE — 3077F SYST BP >= 140 MM HG: CPT | Performed by: INTERNAL MEDICINE

## 2024-02-16 PROCEDURE — 3078F DIAST BP <80 MM HG: CPT | Performed by: INTERNAL MEDICINE

## 2024-02-16 RX ORDER — ATENOLOL 25 MG/1
25 TABLET ORAL 2 TIMES DAILY
Qty: 180 TABLET | Refills: 3 | Status: SHIPPED | OUTPATIENT
Start: 2024-02-16

## 2024-02-16 RX ORDER — FLUTICASONE PROPIONATE 50 MCG
2 SPRAY, SUSPENSION (ML) NASAL NIGHTLY
Qty: 16 G | Refills: 0 | Status: SHIPPED | OUTPATIENT
Start: 2024-02-16

## 2024-02-16 RX ORDER — AMLODIPINE BESYLATE 2.5 MG/1
2.5 TABLET ORAL DAILY
Qty: 100 TABLET | Refills: 3 | Status: SHIPPED | OUTPATIENT
Start: 2024-02-16

## 2024-02-16 RX ORDER — PRAVASTATIN SODIUM 40 MG
TABLET ORAL
Qty: 100 TABLET | Refills: 3 | Status: SHIPPED | OUTPATIENT
Start: 2024-02-16

## 2024-02-16 ASSESSMENT — FIBROSIS 4 INDEX: FIB4 SCORE: 1.36

## 2024-02-16 ASSESSMENT — PATIENT HEALTH QUESTIONNAIRE - PHQ9: CLINICAL INTERPRETATION OF PHQ2 SCORE: 0

## 2024-02-16 ASSESSMENT — ACTIVITIES OF DAILY LIVING (ADL): BATHING_REQUIRES_ASSISTANCE: 0

## 2024-02-16 ASSESSMENT — ENCOUNTER SYMPTOMS: GENERAL WELL-BEING: GOOD

## 2024-02-16 NOTE — PROGRESS NOTES
Chief Complaint   Patient presents with    Annual Exam    Requesting Labs     Blood work       HPI:  Lynne Dawkins is a 89 y.o. here for Medicare Annual Wellness Visit     She has been having runny nose and mucus discharge from the left nostril in the past 2 weeks.  Occasionally coughed up mucus from post nasal drip. no fever or sinus pain. No other symptoms.    Patient Active Problem List    Diagnosis Date Noted    Prediabetes 01/05/2022    Chronic pain of left ankle 02/14/2020    Venous insufficiency of both lower extremities 02/14/2020    Rosacea 05/15/2017    Vitamin D insufficiency 03/23/2014    Von Willebrand disease (HCC) 08/08/2012    Hyperlipidemia 08/08/2012    Essential hypertension 08/08/2012       Current Outpatient Medications   Medication Sig Dispense Refill    fluticasone (FLONASE) 50 MCG/ACT nasal spray Administer 2 Sprays into affected nostril(S) every evening. To left nose 16 g 0    amLODIPine (NORVASC) 2.5 MG Tab Take 1 Tablet by mouth every day. 100 Tablet 3    atenolol (TENORMIN) 25 MG Tab Take 1 Tablet by mouth 2 times a day. 180 Tablet 3    pravastatin (PRAVACHOL) 40 MG tablet TAKE 1 TABLET BY MOUTH EVERY EVENING.(PRAVACHOL) 100 Tablet 3    vitamin D (CHOLECALCIFEROL) 1000 UNIT TABS Take 2,000 Units by mouth every day.       No current facility-administered medications for this visit.          Current supplements as per medication list.     Allergies: Aspirin and Iodine    Current social contact/activities:  lives by self, son in town, painting, keeping in contact with family, gardening in summer. Neighbors checking her daily through window signaling.     She  reports that she has never smoked. She has never used smokeless tobacco. She reports current alcohol use of about 0.6 oz of alcohol per week. She reports that she does not use drugs.  Counseling given: Not Answered      ROS:    Gait: Uses no assistive device  Ostomy: No  Other tubes: No  Amputations: No  Chronic oxygen use: No  Last  eye exam: pt stated she is getting one done in April.   Wears hearing aids: No   : Pt stated she experiences urinary leakage of the last 6 months but hasn't interfered at all with their day.     Screening:     Depression Screening  Little interest or pleasure in doing things?  0 - not at all  Feeling down, depressed , or hopeless? 0 - not at all  Trouble falling or staying asleep, or sleeping too much?     Feeling tired or having little energy?     Poor appetite or overeating?     Feeling bad about yourself - or that you are a failure or have let yourself or your family down?    Trouble concentrating on things, such as reading the newspaper or watching television?    Moving or speaking so slowly that other people could have noticed.  Or the opposite - being so fidgety or restless that you have been moving around a lot more than usual?     Thoughts that you would be better off dead, or of hurting yourself?     Patient Health Questionnaire Score:      If depressive symptoms identified deferred to follow up visit unless specifically addressed in assessment and plan.    Interpretation of PHQ-9 Total Score   Score Severity   1-4 No Depression   5-9 Mild Depression   10-14 Moderate Depression   15-19 Moderately Severe Depression   20-27 Severe Depression    Screening for Cognitive Impairment  Do you or any of your friends or family members have any concern about your memory? No  Three Minute Recall (Banana, Sunrise, Chair) 3/3    Francisco clock face with all 12 numbers and set the hands to show 20 past 8.  Yes    Cognitive concerns identified deferred for follow up unless specifically addressed in assessment and plan.    Fall Risk Assessment  Has the patient had two or more falls in the last year or any fall with injury in the last year?  No    Safety Assessment  Do you always wear your seatbelt?  Yes  Any changes to home needed to function safely? No  Difficulty hearing.  No  Patient counseled about all safety risks that  were identified.    Functional Assessment ADLs  Are there any barriers preventing you from cooking for yourself or meeting nutritional needs?  No.    Are there any barriers preventing you from driving safely or obtaining transportation?  No.    Are there any barriers preventing you from using a telephone or calling for help?  No    Are there any barriers preventing you from shopping?  No.    Are there any barriers preventing you from taking care of your own finances?  No    Are there any barriers preventing you from managing your medications?  No    Are there any barriers preventing you from showering, bathing or dressing yourself? No    Are there any barriers preventing you from doing housework or laundry? No    Are there any barriers preventing you from using the toilet?No    Are you currently engaging in any exercise or physical activity?  Yes. Walks dog everyday    Self-Assessment of Health  What is your perception of your health? Good    Do you sleep more than six hours a night? No    In the past 7 days, how much did pain keep you from doing your normal work? None    Do you spend quality time with family or friends (virtually or in person)? Yes    Do you usually eat a heart healthy diet that constists of a variety of fruits, vegetables, whole grains and fiber? Yes    Do you eat foods high in fat and/or Fast Food more than three times per week? No    How concerned are you that your medical conditions are not being well managed? Not at all    Are you worried that in the next 2 months, you may not have stable housing that you own, rent, or stay in as part of a household? No        Advance Care Planning  Do you have an Advance Directive, Living Will, Durable Power of , or POLST? Yes  Advance Directive Living Will Durable Power of  POLST is not on file - instructed patient to bring in a copy to scan into their chart      Health Maintenance Summary            Overdue - COVID-19 Vaccine (5 - 2023-24  season) Overdue since 9/1/2023 05/22/2022  Imm Admin: OMAIRA ELLIS CAP SARS-COV-2 VACCINATION (12+)    11/21/2021  Imm Admin: MODERNA SARS-COV-2 VACCINE (12+)    03/01/2021  Imm Admin: MODERNA SARS-COV-2 VACCINE (12+)    02/01/2021  Imm Admin: MODERNA SARS-COV-2 VACCINE (12+)              Annual Wellness Visit (Yearly) Next due on 2/16/2025 02/16/2024  Visit Dx: Encounter for Medicare annual wellness exam    02/14/2023  Visit Dx: Encounter for Medicare annual wellness exam    10/02/2015  Visit Dx: Medicare annual wellness visit, subsequent              IMM DTaP/Tdap/Td Vaccine (3 - Td or Tdap) Next due on 6/16/2031 06/16/2021  Imm Admin: Tdap Vaccine    12/06/2010  Imm Admin: Tdap Vaccine              Pneumococcal Vaccine: 65+ Years (Series Information) Completed      10/02/2015  Imm Admin: Pneumococcal Conjugate Vaccine (Prevnar/PCV-13)    12/06/2010  Imm Admin: Pneumococcal polysaccharide vaccine (PPSV-23)              Influenza Vaccine (Series Information) Completed      01/18/2024  Outside Immunization: Influenza Quad Inj P    10/28/2022  Imm Admin: Influenza Vaccine Adult HD    11/21/2021  Imm Admin: Influenza Vaccine Adult HD    11/19/2019  Imm Admin: Influenza Vaccine Adult HD    10/01/2018  Imm Admin: Influenza Vaccine Adult HD    Only the first 5 history entries have been loaded, but more history exists.              Hepatitis A Vaccine (Hep A) (Series Information) Aged Out      No completion history exists for this topic.              Hepatitis B Vaccine (Hep B) (Series Information) Aged Out      No completion history exists for this topic.              HPV Vaccines (Series Information) Aged Out      No completion history exists for this topic.              Polio Vaccine (Inactivated Polio) (Series Information) Aged Out      No completion history exists for this topic.              Meningococcal Immunization (Series Information) Aged Out      No completion history exists for this topic.       "        Discontinued - Bone Density Scan  Discontinued      No completion history exists for this topic.              Discontinued - Zoster (Shingles) Vaccines  Discontinued      09/19/2023  Outside Immunization: Zoster Stefano (Shingrix)    07/19/2023  Outside Immunization: Zoster Stefano (Shingrix)    12/06/2010  Imm Admin: Zoster Vaccine Live (ZVL) (Zostavax) - HISTORICAL DATA                    Patient Care Team:  Alexandra Warner M.D. as PCP - General (Internal Medicine)  Zoe Mitchell M.D. as PCP - [facility] Paneled  Nnamdi Wells O.D. (Optometry)  Maureen Adhikari (Inactive)  Celso Gallego, PT as Physical Therapist (Physical Therapy)      Social History     Tobacco Use    Smoking status: Never    Smokeless tobacco: Never   Vaping Use    Vaping Use: Never used   Substance Use Topics    Alcohol use: Yes     Alcohol/week: 0.6 oz     Types: 1 Glasses of wine per week     Comment: 1/week    Drug use: No     Family History   Problem Relation Age of Onset    Cancer Mother         d 31 yo. Unk type.    Heart Disease Sister         d 69    Cancer Brother         d 73 brain cancer     She  has a past medical history of Bleeding disorder (HCC), HTN (hypertension), transfusion of packed red blood cells, Hyperlipidemia LDL goal < 100, Hypertension, and Vitamin D insufficiency (3/23/2014).   Past Surgical History:   Procedure Laterality Date    CATARACT PHACO WITH IOL  7/16/2012    Performed by J[facility]EL MACDONALD at [facility]    CATARACT PHACO WITH IOL  7/2/2012    Performed by [facility]DON[facility]D at [facility]    HYSTERECTOMY, VAGINAL         Exam:   BP (!) 140/70 (BP Location: Left arm, Patient Position: Sitting, BP Cuff Size: Adult)   Pulse 61   Temp 36.4 °C (97.6 °F) (Temporal)   Ht 1.676 m (5' 6\")   Wt 68.9 kg (152 lb)   SpO2 98%  Body mass index is 24.53 kg/m².    Hearing excellent.    Dentition will have to have a tooth pull. Chewing food is okay. Eye exam in April  Alert, oriented in no acute " distress.  Eye contact is good, speech goal directed, affect calm    Assessment and Plan. The following treatment and monitoring plan is recommended:     1. Encounter for Medicare annual wellness exam    2. Von Willebrand disease (HCC)  - CBC WITH DIFFERENTIAL; Future    3. Prediabetes  Stable  - HEMOGLOBIN A1C; Future    4. Pure hypercholesterolemia  Controlled with pravastatin  - Comp Metabolic Panel; Future  - Lipid Profile; Future  - pravastatin (PRAVACHOL) 40 MG tablet; TAKE 1 TABLET BY MOUTH EVERY EVENING.(PRAVACHOL)  Dispense: 100 Tablet; Refill: 3    5. Vitamin D insufficiency  - VITAMIN D,25 HYDROXY (DEFICIENCY); Future    6. Weight gain  She gains a few pounds in winter and lose a few pounds in summer due to level of activity.  - TSH WITH REFLEX TO FT4; Future    7. Acute rhinitis  - fluticasone (FLONASE) 50 MCG/ACT nasal spray; Administer 2 Sprays into affected nostril(S) every evening. To left nose  Dispense: 16 g; Refill: 0    8. Essential hypertension  BP is slightly higher today because she did not sleep the whole night last night after receiving the news that her daughter-in-law has stage IV colon cancer beyond treatment.  Given her age,  is reasonable goal.  We will not change medication today.  - amLODIPine (NORVASC) 2.5 MG Tab; Take 1 Tablet by mouth every day.  Dispense: 100 Tablet; Refill: 3  - atenolol (TENORMIN) 25 MG Tab; Take 1 Tablet by mouth 2 times a day.  Dispense: 180 Tablet; Refill: 3      Services suggested: No services needed at this time  Health Care Screening: Age-appropriate preventive services recommended by USPTF and ACIP covered by Medicare were discussed today. Services ordered if indicated and agreed upon by the patient.  Referrals offered: Community-based lifestyle interventions to reduce health risks and promote self-management and wellness, fall prevention, nutrition, physical activity, tobacco-use cessation, weight loss, and mental health services as per orders if  indicated.    Discussion today about general wellness and lifestyle habits:    Prevent falls and reduce trip hazards; Cautioned about securing or removing rugs.  Have a working fire alarm and carbon monoxide detector;   Engage in regular physical activity and social activities     Follow-up: Return in about 1 year (around 2/16/2025) for Annual wellness visit.

## 2024-02-28 ENCOUNTER — HOSPITAL ENCOUNTER (OUTPATIENT)
Dept: LAB | Facility: MEDICAL CENTER | Age: 89
End: 2024-02-28
Attending: INTERNAL MEDICINE
Payer: MEDICARE

## 2024-02-28 DIAGNOSIS — E78.00 PURE HYPERCHOLESTEROLEMIA: ICD-10-CM

## 2024-02-28 DIAGNOSIS — D68.00 VON WILLEBRAND DISEASE (HCC): ICD-10-CM

## 2024-02-28 DIAGNOSIS — R63.5 WEIGHT GAIN: ICD-10-CM

## 2024-02-28 DIAGNOSIS — E55.9 VITAMIN D INSUFFICIENCY: ICD-10-CM

## 2024-02-28 DIAGNOSIS — R73.03 PREDIABETES: ICD-10-CM

## 2024-02-28 LAB
25(OH)D3 SERPL-MCNC: 42 NG/ML (ref 30–100)
ALBUMIN SERPL BCP-MCNC: 4.5 G/DL (ref 3.2–4.9)
ALBUMIN/GLOB SERPL: 1.9 G/DL
ALP SERPL-CCNC: 108 U/L (ref 30–99)
ALT SERPL-CCNC: 9 U/L (ref 2–50)
ANION GAP SERPL CALC-SCNC: 11 MMOL/L (ref 7–16)
AST SERPL-CCNC: 15 U/L (ref 12–45)
BASOPHILS # BLD AUTO: 0.8 % (ref 0–1.8)
BASOPHILS # BLD: 0.05 K/UL (ref 0–0.12)
BILIRUB SERPL-MCNC: 0.5 MG/DL (ref 0.1–1.5)
BUN SERPL-MCNC: 13 MG/DL (ref 8–22)
CALCIUM ALBUM COR SERPL-MCNC: 8.8 MG/DL (ref 8.5–10.5)
CALCIUM SERPL-MCNC: 9.2 MG/DL (ref 8.4–10.2)
CHLORIDE SERPL-SCNC: 101 MMOL/L (ref 96–112)
CHOLEST SERPL-MCNC: 211 MG/DL (ref 100–199)
CO2 SERPL-SCNC: 29 MMOL/L (ref 20–33)
CREAT SERPL-MCNC: 0.7 MG/DL (ref 0.5–1.4)
EOSINOPHIL # BLD AUTO: 0.16 K/UL (ref 0–0.51)
EOSINOPHIL NFR BLD: 2.5 % (ref 0–6.9)
ERYTHROCYTE [DISTWIDTH] IN BLOOD BY AUTOMATED COUNT: 43.6 FL (ref 35.9–50)
EST. AVERAGE GLUCOSE BLD GHB EST-MCNC: 105 MG/DL
FASTING STATUS PATIENT QL REPORTED: NORMAL
GFR SERPLBLD CREATININE-BSD FMLA CKD-EPI: 81 ML/MIN/1.73 M 2
GLOBULIN SER CALC-MCNC: 2.4 G/DL (ref 1.9–3.5)
GLUCOSE SERPL-MCNC: 97 MG/DL (ref 65–99)
HBA1C MFR BLD: 5.3 % (ref 4–5.6)
HCT VFR BLD AUTO: 41.3 % (ref 37–47)
HDLC SERPL-MCNC: 107 MG/DL
HGB BLD-MCNC: 13.7 G/DL (ref 12–16)
IMM GRANULOCYTES # BLD AUTO: 0.02 K/UL (ref 0–0.11)
IMM GRANULOCYTES NFR BLD AUTO: 0.3 % (ref 0–0.9)
LDLC SERPL CALC-MCNC: 86 MG/DL
LYMPHOCYTES # BLD AUTO: 1.57 K/UL (ref 1–4.8)
LYMPHOCYTES NFR BLD: 24.3 % (ref 22–41)
MCH RBC QN AUTO: 31.3 PG (ref 27–33)
MCHC RBC AUTO-ENTMCNC: 33.2 G/DL (ref 32.2–35.5)
MCV RBC AUTO: 94.3 FL (ref 81.4–97.8)
MONOCYTES # BLD AUTO: 0.58 K/UL (ref 0–0.85)
MONOCYTES NFR BLD AUTO: 9 % (ref 0–13.4)
NEUTROPHILS # BLD AUTO: 4.08 K/UL (ref 1.82–7.42)
NEUTROPHILS NFR BLD: 63.1 % (ref 44–72)
NRBC # BLD AUTO: 0 K/UL
NRBC BLD-RTO: 0 /100 WBC (ref 0–0.2)
PLATELET # BLD AUTO: 311 K/UL (ref 164–446)
PMV BLD AUTO: 9.6 FL (ref 9–12.9)
POTASSIUM SERPL-SCNC: 4.3 MMOL/L (ref 3.6–5.5)
PROT SERPL-MCNC: 6.9 G/DL (ref 6–8.2)
RBC # BLD AUTO: 4.38 M/UL (ref 4.2–5.4)
SODIUM SERPL-SCNC: 141 MMOL/L (ref 135–145)
TRIGL SERPL-MCNC: 91 MG/DL (ref 0–149)
TSH SERPL DL<=0.005 MIU/L-ACNC: 2.7 UIU/ML (ref 0.38–5.33)
WBC # BLD AUTO: 6.5 K/UL (ref 4.8–10.8)

## 2024-02-28 PROCEDURE — 80061 LIPID PANEL: CPT

## 2024-02-28 PROCEDURE — 84443 ASSAY THYROID STIM HORMONE: CPT

## 2024-02-28 PROCEDURE — 82306 VITAMIN D 25 HYDROXY: CPT

## 2024-02-28 PROCEDURE — 36415 COLL VENOUS BLD VENIPUNCTURE: CPT

## 2024-02-28 PROCEDURE — 80053 COMPREHEN METABOLIC PANEL: CPT

## 2024-02-28 PROCEDURE — 85025 COMPLETE CBC W/AUTO DIFF WBC: CPT

## 2024-02-28 PROCEDURE — 83036 HEMOGLOBIN GLYCOSYLATED A1C: CPT

## 2024-05-06 ENCOUNTER — TELEPHONE (OUTPATIENT)
Dept: HEALTH INFORMATION MANAGEMENT | Facility: OTHER | Age: 89
End: 2024-05-06
Payer: MEDICARE

## 2024-10-15 ENCOUNTER — OFFICE VISIT (OUTPATIENT)
Dept: MEDICAL GROUP | Facility: MEDICAL CENTER | Age: 89
End: 2024-10-15
Payer: MEDICARE

## 2024-10-15 VITALS
WEIGHT: 147.82 LBS | HEART RATE: 68 BPM | DIASTOLIC BLOOD PRESSURE: 82 MMHG | BODY MASS INDEX: 24.63 KG/M2 | RESPIRATION RATE: 14 BRPM | HEIGHT: 65 IN | SYSTOLIC BLOOD PRESSURE: 146 MMHG | OXYGEN SATURATION: 96 % | TEMPERATURE: 98.2 F

## 2024-10-15 DIAGNOSIS — Z00.00 ENCOUNTER FOR PREVENTIVE CARE: ICD-10-CM

## 2024-10-15 DIAGNOSIS — I10 ESSENTIAL HYPERTENSION: ICD-10-CM

## 2024-10-15 DIAGNOSIS — Z23 ENCOUNTER FOR IMMUNIZATION: ICD-10-CM

## 2024-10-15 PROCEDURE — G0008 ADMIN INFLUENZA VIRUS VAC: HCPCS | Performed by: STUDENT IN AN ORGANIZED HEALTH CARE EDUCATION/TRAINING PROGRAM

## 2024-10-15 PROCEDURE — 99214 OFFICE O/P EST MOD 30 MIN: CPT | Mod: 25 | Performed by: STUDENT IN AN ORGANIZED HEALTH CARE EDUCATION/TRAINING PROGRAM

## 2024-10-15 PROCEDURE — 3077F SYST BP >= 140 MM HG: CPT | Performed by: STUDENT IN AN ORGANIZED HEALTH CARE EDUCATION/TRAINING PROGRAM

## 2024-10-15 PROCEDURE — 3079F DIAST BP 80-89 MM HG: CPT | Performed by: STUDENT IN AN ORGANIZED HEALTH CARE EDUCATION/TRAINING PROGRAM

## 2024-10-15 PROCEDURE — 90662 IIV NO PRSV INCREASED AG IM: CPT | Performed by: STUDENT IN AN ORGANIZED HEALTH CARE EDUCATION/TRAINING PROGRAM

## 2024-10-15 RX ORDER — AMLODIPINE BESYLATE 2.5 MG/1
5 TABLET ORAL DAILY
Qty: 100 TABLET | Refills: 3 | Status: SHIPPED | OUTPATIENT
Start: 2024-10-15

## 2024-10-15 ASSESSMENT — FIBROSIS 4 INDEX: FIB4 SCORE: 1.5

## 2024-10-15 ASSESSMENT — PAIN SCALES - GENERAL: PAINLEVEL: NO PAIN

## 2025-01-15 ENCOUNTER — HOSPITAL ENCOUNTER (OUTPATIENT)
Dept: LAB | Facility: MEDICAL CENTER | Age: OVER 89
End: 2025-01-15
Attending: STUDENT IN AN ORGANIZED HEALTH CARE EDUCATION/TRAINING PROGRAM
Payer: MEDICARE

## 2025-01-15 DIAGNOSIS — Z00.00 ENCOUNTER FOR PREVENTIVE CARE: ICD-10-CM

## 2025-01-15 LAB
25(OH)D3 SERPL-MCNC: 55 NG/ML (ref 30–100)
ALBUMIN SERPL BCP-MCNC: 4.1 G/DL (ref 3.2–4.9)
ALBUMIN/GLOB SERPL: 1.7 G/DL
ALP SERPL-CCNC: 103 U/L (ref 30–99)
ALT SERPL-CCNC: 10 U/L (ref 2–50)
ANION GAP SERPL CALC-SCNC: 8 MMOL/L (ref 7–16)
AST SERPL-CCNC: 13 U/L (ref 12–45)
BASOPHILS # BLD AUTO: 0.5 % (ref 0–1.8)
BASOPHILS # BLD: 0.04 K/UL (ref 0–0.12)
BILIRUB SERPL-MCNC: 0.4 MG/DL (ref 0.1–1.5)
BUN SERPL-MCNC: 12 MG/DL (ref 8–22)
CALCIUM ALBUM COR SERPL-MCNC: 8.8 MG/DL (ref 8.5–10.5)
CALCIUM SERPL-MCNC: 8.9 MG/DL (ref 8.4–10.2)
CHLORIDE SERPL-SCNC: 102 MMOL/L (ref 96–112)
CHOLEST SERPL-MCNC: 201 MG/DL (ref 100–199)
CO2 SERPL-SCNC: 30 MMOL/L (ref 20–33)
CREAT SERPL-MCNC: 0.7 MG/DL (ref 0.5–1.4)
EOSINOPHIL # BLD AUTO: 0.2 K/UL (ref 0–0.51)
EOSINOPHIL NFR BLD: 2.3 % (ref 0–6.9)
ERYTHROCYTE [DISTWIDTH] IN BLOOD BY AUTOMATED COUNT: 46.1 FL (ref 35.9–50)
FASTING STATUS PATIENT QL REPORTED: NORMAL
GFR SERPLBLD CREATININE-BSD FMLA CKD-EPI: 80 ML/MIN/1.73 M 2
GLOBULIN SER CALC-MCNC: 2.4 G/DL (ref 1.9–3.5)
GLUCOSE SERPL-MCNC: 85 MG/DL (ref 65–99)
HCT VFR BLD AUTO: 41.1 % (ref 37–47)
HDLC SERPL-MCNC: 109 MG/DL
HGB BLD-MCNC: 13.1 G/DL (ref 12–16)
IMM GRANULOCYTES # BLD AUTO: 0.03 K/UL (ref 0–0.11)
IMM GRANULOCYTES NFR BLD AUTO: 0.4 % (ref 0–0.9)
LDLC SERPL CALC-MCNC: 78 MG/DL
LYMPHOCYTES # BLD AUTO: 1.59 K/UL (ref 1–4.8)
LYMPHOCYTES NFR BLD: 18.6 % (ref 22–41)
MCH RBC QN AUTO: 30.5 PG (ref 27–33)
MCHC RBC AUTO-ENTMCNC: 31.9 G/DL (ref 32.2–35.5)
MCV RBC AUTO: 95.6 FL (ref 81.4–97.8)
MONOCYTES # BLD AUTO: 0.8 K/UL (ref 0–0.85)
MONOCYTES NFR BLD AUTO: 9.3 % (ref 0–13.4)
NEUTROPHILS # BLD AUTO: 5.91 K/UL (ref 1.82–7.42)
NEUTROPHILS NFR BLD: 68.9 % (ref 44–72)
NRBC # BLD AUTO: 0 K/UL
NRBC BLD-RTO: 0 /100 WBC (ref 0–0.2)
PLATELET # BLD AUTO: 307 K/UL (ref 164–446)
PMV BLD AUTO: 9.5 FL (ref 9–12.9)
POTASSIUM SERPL-SCNC: 4 MMOL/L (ref 3.6–5.5)
PROT SERPL-MCNC: 6.5 G/DL (ref 6–8.2)
RBC # BLD AUTO: 4.3 M/UL (ref 4.2–5.4)
SODIUM SERPL-SCNC: 140 MMOL/L (ref 135–145)
TRIGL SERPL-MCNC: 70 MG/DL (ref 0–149)
TSH SERPL DL<=0.005 MIU/L-ACNC: 4.39 UIU/ML (ref 0.38–5.33)
WBC # BLD AUTO: 8.6 K/UL (ref 4.8–10.8)

## 2025-01-15 PROCEDURE — 82306 VITAMIN D 25 HYDROXY: CPT

## 2025-01-15 PROCEDURE — 84443 ASSAY THYROID STIM HORMONE: CPT

## 2025-01-15 PROCEDURE — 85025 COMPLETE CBC W/AUTO DIFF WBC: CPT

## 2025-01-15 PROCEDURE — 36415 COLL VENOUS BLD VENIPUNCTURE: CPT

## 2025-01-15 PROCEDURE — 80061 LIPID PANEL: CPT

## 2025-01-15 PROCEDURE — 80053 COMPREHEN METABOLIC PANEL: CPT

## 2025-02-13 ENCOUNTER — APPOINTMENT (OUTPATIENT)
Dept: MEDICAL GROUP | Facility: MEDICAL CENTER | Age: OVER 89
End: 2025-02-13
Payer: MEDICARE

## 2025-03-10 DIAGNOSIS — E78.00 PURE HYPERCHOLESTEROLEMIA: ICD-10-CM

## 2025-03-10 RX ORDER — PRAVASTATIN SODIUM 40 MG
TABLET ORAL
Qty: 100 TABLET | Refills: 3 | Status: SHIPPED | OUTPATIENT
Start: 2025-03-10

## 2025-03-10 NOTE — TELEPHONE ENCOUNTER
Received request via: Pharmacy    Was the patient seen in the last year in this department? Yes    Does the patient have an active prescription (recently filled or refills available) for medication(s) requested? No    [facility]     Does the patient have [facility] Plus and need 100-day supply? (This applies to ALL medications) Yes, quantity updated to 100 days

## 2025-04-17 ENCOUNTER — OFFICE VISIT (OUTPATIENT)
Dept: MEDICAL GROUP | Facility: MEDICAL CENTER | Age: OVER 89
End: 2025-04-17
Payer: MEDICARE

## 2025-04-17 VITALS
HEART RATE: 68 BPM | SYSTOLIC BLOOD PRESSURE: 122 MMHG | WEIGHT: 145.5 LBS | BODY MASS INDEX: 24.24 KG/M2 | OXYGEN SATURATION: 97 % | HEIGHT: 65 IN | DIASTOLIC BLOOD PRESSURE: 64 MMHG | TEMPERATURE: 98.4 F

## 2025-04-17 DIAGNOSIS — H91.91 HEARING LOSS OF RIGHT EAR, UNSPECIFIED HEARING LOSS TYPE: ICD-10-CM

## 2025-04-17 DIAGNOSIS — I10 ESSENTIAL HYPERTENSION: ICD-10-CM

## 2025-04-17 DIAGNOSIS — E78.00 PURE HYPERCHOLESTEROLEMIA: ICD-10-CM

## 2025-04-17 DIAGNOSIS — H61.21 IMPACTED CERUMEN OF RIGHT EAR: ICD-10-CM

## 2025-04-17 DIAGNOSIS — D68.00 VON WILLEBRAND DISEASE (HCC): ICD-10-CM

## 2025-04-17 ASSESSMENT — ACTIVITIES OF DAILY LIVING (ADL): BATHING_REQUIRES_ASSISTANCE: 0

## 2025-04-17 ASSESSMENT — PATIENT HEALTH QUESTIONNAIRE - PHQ9: CLINICAL INTERPRETATION OF PHQ2 SCORE: 0

## 2025-04-17 ASSESSMENT — FIBROSIS 4 INDEX: FIB4 SCORE: 1.25

## 2025-04-17 ASSESSMENT — ENCOUNTER SYMPTOMS: GENERAL WELL-BEING: GOOD

## 2025-04-17 NOTE — ASSESSMENT & PLAN NOTE
- Unable to remove wax with ear lavage.  Manual removal of earwax from the right ear was performed.  However hearing loss persists.  Referral to ENT provided

## 2025-04-17 NOTE — PROGRESS NOTES
Chief Complaint   Patient presents with    Annual Exam     AWV       HPI:  Lynne Dawkins is a 89 y.o. here for Medicare Annual Wellness Visit     Patient reports hearing loss in the right ear since last 2 weeks.  Exposure to loud sounds.  No ear pain, no ear discharge    Patient Active Problem List    Diagnosis Date Noted    Impacted cerumen of right ear 04/17/2025    Prediabetes 01/05/2022    Chronic pain of left ankle 02/14/2020    Venous insufficiency of both lower extremities 02/14/2020    Rosacea 05/15/2017    Vitamin D insufficiency 03/23/2014    Von Willebrand disease (HCC) 08/08/2012    Hyperlipidemia 08/08/2012    Essential hypertension 08/08/2012       Current Outpatient Medications   Medication Sig Dispense Refill    pravastatin (PRAVACHOL) 40 MG tablet TAKE ONE TABLET BY MOUTH EVERY EVENING (PRAVACHOL) 100 Tablet 3    amLODIPine (NORVASC) 2.5 MG Tab Take 2 Tablets by mouth every day. 100 Tablet 3    atenolol (TENORMIN) 25 MG Tab Take 1 Tablet by mouth 2 times a day. 180 Tablet 3    vitamin D (CHOLECALCIFEROL) 1000 UNIT TABS Take 2,000 Units by mouth every day.       No current facility-administered medications for this visit.          Current supplements as per medication list.     Allergies: Aspirin and Iodine    Current social contact/activities: Active with family or friends     She  reports that she has never smoked. She has never used smokeless tobacco. She reports current alcohol use of about 0.6 oz of alcohol per week. She reports current drug use.  Counseling given: Not Answered      ROS:    Gait: Uses no assistive device  Ostomy: No  Other tubes: No  Amputations: No  Chronic oxygen use: No  Last eye exam: Patient can't remember, states she has to get it next month   Wears hearing aids: No   : Reports urinary leakage during the last 6 months that has somewhat interfered with their daily activities or sleep.    Screening:  Uptodate  Depression Screening  Little interest or pleasure in  doing things?  0 - not at all  Feeling down, depressed , or hopeless? 0 - not at all  Patient Health Questionnaire Score: 0     If depressive symptoms identified deferred to follow up visit unless specifically addressed in assessment and plan.    Interpretation of PHQ-9 Total Score   Score Severity   1-4 No Depression   5-9 Mild Depression   10-14 Moderate Depression   15-19 Moderately Severe Depression   20-27 Severe Depression    Screening for Cognitive Impairment  Do you or any of your friends or family members have any concern about your memory? No  Three Minute Recall (Village, Kitchen, Baby) 3/3    Francisco clock face with all 12 numbers and set the hands to show 10 minutes past 11.  Yes    Cognitive concerns identified deferred for follow up unless specifically addressed in assessment and plan.    Fall Risk Assessment  Has the patient had two or more falls in the last year or any fall with injury in the last year?  No    Safety Assessment  Do you always wear your seatbelt?  Yes  Any changes to home needed to function safely? No  Difficulty hearing.  Yes  Patient counseled about all safety risks that were identified.    Functional Assessment ADLs  Are there any barriers preventing you from cooking for yourself or meeting nutritional needs?  No.    Are there any barriers preventing you from driving safely or obtaining transportation?  No.    Are there any barriers preventing you from using a telephone or calling for help?  No    Are there any barriers preventing you from shopping?  No.    Are there any barriers preventing you from taking care of your own finances?  No    Are there any barriers preventing you from managing your medications?  No    Are there any barriers preventing you from showering, bathing or dressing yourself? No    Are there any barriers preventing you from doing housework or laundry? No  Are there any barriers preventing you from using the toilet?No  Are you currently engaging in any exercise  or physical activity?  Yes.      Self-Assessment of Health  What is your perception of your health? Good    Do you sleep more than six hours a night? Yes    In the past 7 days, how much did pain keep you from doing your normal work? None    Do you spend quality time with family or friends (virtually or in person)? Yes    Do you usually eat a heart healthy diet that constists of a variety of fruits, vegetables, whole grains and fiber? Yes    Do you eat foods high in fat and/or Fast Food more than three times per week? No    How concerned are you that your medical conditions are not being well managed? Not at all    Are you worried that in the next 2 months, you may not have stable housing that you own, rent, or stay in as part of a household? No      Advance Care Planning  Do you have an Advance Directive, Living Will, Durable Power of , or POLST? Yes  Advance Directive Living Will Durable Power of    is not on file - instructed patient to bring in a copy to scan into their chart      Health Maintenance Summary            Upcoming       COVID-19 Vaccine (6 - 2024-25 season) Next due on 5/12/2025 11/12/2024  Imm Admin: COVID-19, mRNA, LNP-S, PF, karie-sucrose, 30 mcg/0.3 mL    05/22/2022  Imm Admin: EMED Co ELLIS CAP SARS-COV-2 VACCINATION (12+)    11/21/2021  Imm Admin: MODERNA SARS-COV-2 VACCINE (12+)    03/01/2021  Imm Admin: MODERNA SARS-COV-2 VACCINE (12+)    02/01/2021  Imm Admin: MODERNA SARS-COV-2 VACCINE (12+)     Only the first 5 history entries have been loaded, but more history exists.            Annual Wellness Visit (Yearly) Next due on 4/17/2026 04/17/2025  Level of Service: SC ANNUAL WELLNESS VISIT-INCLUDES PPPS SUBSEQUE*    02/16/2024  Visit Dx: Encounter for Medicare annual wellness exam    02/14/2023  Visit Dx: Encounter for Medicare annual wellness exam    10/02/2015  Visit Dx: Medicare annual wellness visit, subsequent              IMM DTaP/Tdap/Td Vaccine (3 - Td or Tdap)  Next due on 6/16/2031 06/16/2021  Imm Admin: Tdap Vaccine    12/06/2010  Imm Admin: Tdap Vaccine                      Completed or No Longer Recommended       Influenza Vaccine (Series Information) Completed      10/15/2024  Imm Admin: Influenza high-dose trivalent (PF)    01/18/2024  Outside Immunization: Influenza Quad Inj P    10/28/2022  Imm Admin: Influenza Vaccine Adult HD    11/21/2021  Imm Admin: Influenza Vaccine Adult HD    11/19/2019  Imm Admin: Influenza Vaccine Adult HD      Only the first 5 history entries have been loaded, but more history exists.              Pneumococcal Vaccine: 50+ Years (Series Information) Completed      10/02/2015  Imm Admin: Pneumococcal Conjugate Vaccine (Prevnar/PCV-13)    12/06/2010  Imm Admin: Pneumococcal polysaccharide vaccine (PPSV-23)              Hepatitis A Vaccine (Hep A) (Series Information) Aged Out      No completion history exists for this topic.              Hepatitis B Vaccine (Hep B) (Series Information) Aged Out     No completion history exists for this topic.              HPV Vaccines (Series Information) Aged Out     No completion history exists for this topic.              Polio Vaccine (Inactivated Polio) (Series Information) Aged Out     No completion history exists for this topic.              Meningococcal Immunization (Series Information) Aged Out     No completion history exists for this topic.              Bone Density Scan  Discontinued     No completion history exists for this topic.              Zoster (Shingles) Vaccines  Discontinued      09/19/2023  Outside Immunization: Zoster Stefano (Shingrix)    07/19/2023  Outside Immunization: Zoster Stefano (Shingrix)    12/06/2010  Imm Admin: Zoster Vaccine Live (ZVL) (Zostavax) - HISTORICAL DATA                            Patient Care Team:  Goldie Dumont M.D. as PCP - General (Internal Medicine)  Zoe Mitchell M.D. as PCP - [facility] Paneled  Nnamdi Wells O.D. (Optometry)  Maureen Adhikari  "(Inactive)  Celso Gallego, PT (Inactive) as Physical Therapist (Physical Therapy)        Social History     Tobacco Use    Smoking status: Never    Smokeless tobacco: Never   Vaping Use    Vaping status: Never Used   Substance Use Topics    Alcohol use: Yes     Alcohol/week: 0.6 oz     Types: 1 Glasses of wine per week     Comment: 1/week    Drug use: Yes     Comment: CBD Gel     Family History   Problem Relation Age of Onset    Cancer Mother         d 31 yo. Unk type.    Heart Disease Sister         d 69    Cancer Brother         d 73 brain cancer     She  has a past medical history of Bleeding disorder (HCC), HTN (hypertension), transfusion of packed red blood cells, Hyperlipidemia LDL goal < 100, Hypertension, and Vitamin D insufficiency (3/23/2014).   Past Surgical History:   Procedure Laterality Date    CATARACT PHACO WITH IOL  7/16/2012    Performed by JEREL MACDONALD at [facility]    CATARACT PHACO WITH IOL  7/2/2012    Performed by JEREL MACDONALD at [facility]    HYSTERECTOMY, VAGINAL         Exam:   /64 (BP Location: Left arm, Patient Position: Sitting, BP Cuff Size: Adult)   Pulse 68   Temp 36.9 °C (98.4 °F) (Temporal)   Ht 1.651 m (5' 5\")   Wt 66 kg (145 lb 8.1 oz)   SpO2 97%  Body mass index is 24.21 kg/m².    Hearing poor.    Dentition upper dentures  Alert, oriented in no acute distress.  Eye contact is good, speech goal directed, affect calm    Assessment and Plan. The following treatment and monitoring plan is recommended:    Problem List Items Addressed This Visit          Family Medicine Problems    Essential hypertension    - Continue amlodipine 5 mg daily and atenolol 25 mg twice daily             Other    Von Willebrand disease (HCC)    - Chronic, stable         Hyperlipidemia    - Continue pravastatin 40 mg daily         Impacted cerumen of right ear    - Unable to remove wax with ear lavage.  Manual removal of earwax from the right ear was " performed.  However hearing loss persists.  Referral to ENT provided         Relevant Orders    Ear Irrigation (MA Only)    Ear Wax Removal (Completed)     Other Visit Diagnoses         Hearing loss of right ear, unspecified hearing loss type        Relevant Orders    Referral to ENT          Ear Wax Removal    Date/Time: 4/29/2025 2:53 PM    Performed by: Goldie Dumont M.D.  Authorized by: Goldie Dumont M.D.    Anesthesia:  Local Anesthetic: none  Location details: right ear  Patient tolerance: patient tolerated the procedure well with no immediate complications  Procedure type: curette   Sedation:  Patient sedated: no             Services suggested: No services needed at this time  Health Care Screening: Age-appropriate preventive services recommended by USPTF and ACIP covered by Medicare were discussed today. Services ordered if indicated and agreed upon by the patient.  Referrals offered: Community-based lifestyle interventions to reduce health risks and promote self-management and wellness, fall prevention, nutrition, physical activity, tobacco-use cessation, weight loss, and mental health services as per orders if indicated.    Discussion today about general wellness and lifestyle habits:    Prevent falls and reduce trip hazards; Cautioned about securing or removing rugs.  Have a working fire alarm and carbon monoxide detector;   Engage in regular physical activity and social activities     Follow-up: Return in about 6 months (around 10/17/2025), or if symptoms worsen or fail to improve, for Hypertension, Medicationrefill.

## 2025-04-23 NOTE — Clinical Note
REFERRAL APPROVAL NOTICE         Sent on April 23, 2025                   Lynne Dawkins  [address]  [place] [address]                   Dear Ms. Dawkins,    After a careful review of the medical information and benefit coverage, [facility] has processed your referral. See below for additional details.    If applicable, you must be actively enrolled with your insurance for coverage of the authorized service. If you have any questions regarding your coverage, please contact your insurance directly.    REFERRAL INFORMATION   Referral #:  [id number]  Referred-To Provider    Referred-By Provider:  Otolaryngology    Goldie Dumont M.D.   [facility]      [address]  [address]  [place] [address]  [phone] [address] [place] [address]  [phone]    Referral Start Date:  04/17/2025  Referral End Date:   04/17/2026             SCHEDULING  If you do not already have an appointment, please call [phone] to make an appointment.     MORE INFORMATION  If you do not already have a [employer] account, sign up at: [email or web address]  You can access your medical information, make appointments, see lab results, billing information, and more.  If you have questions regarding this referral, please contact  the [facility] Referrals department at:             [phone]. Monday - Friday 8:00AM - 5:00PM.     Sincerely,    [facility]

## 2025-04-30 NOTE — Clinical Note
REFERRAL APPROVAL NOTICE         Sent on April 30, 2025                   Lynne Dawkins  [address]  [place] [address]                   Dear Ms. Dawkins,    After a careful review of the medical information and benefit coverage, [facility] has processed your referral. See below for additional details.    If applicable, you must be actively enrolled with your insurance for coverage of the authorized service. If you have any questions regarding your coverage, please contact your insurance directly.    REFERRAL INFORMATION   Referral #:  [id number]  Referred-To Provider    Referred-By Provider:  Otolaryngology    JELANI Castellanos PAUL D      [address]  [address]  [facility] [address]  [phone] [address]  [place] [address]  [phone]    Referral Start Date:  04/30/2025  Referral End Date:   04/30/2026             SCHEDULING  If you do not already have an appointment, please call [phone] to make an appointment.     MORE INFORMATION  If you do not already have a [employer] account, sign up at: [email or web address]  You can access your medical information, make appointments, see lab results, billing information, and more.  If you have questions regarding this referral, please contact  the [facility] Referrals department at:             [phone]. Monday - Friday 8:00AM - 5:00PM.     Sincerely,    [facility]

## 2025-05-02 DIAGNOSIS — I10 ESSENTIAL HYPERTENSION: ICD-10-CM

## 2025-05-02 RX ORDER — AMLODIPINE BESYLATE 2.5 MG/1
5 TABLET ORAL DAILY
Qty: 100 TABLET | Refills: 3 | Status: SHIPPED | OUTPATIENT
Start: 2025-05-02

## 2025-05-06 DIAGNOSIS — I10 ESSENTIAL HYPERTENSION: ICD-10-CM

## 2025-05-06 RX ORDER — ATENOLOL 25 MG/1
25 TABLET ORAL 2 TIMES DAILY
Qty: 180 TABLET | Refills: 3 | Status: SHIPPED | OUTPATIENT
Start: 2025-05-06

## 2025-06-10 ENCOUNTER — TELEPHONE (OUTPATIENT)
Dept: MEDICAL GROUP | Facility: MEDICAL CENTER | Age: OVER 89
End: 2025-06-10
Payer: MEDICARE

## 2025-06-10 NOTE — TELEPHONE ENCOUNTER
VOICEMAIL  1. Caller Name: Pts daughter in law                      Call Back Number: [phone]    2. Message: Called in stating that patient has a continual hearing problem even while using ear drops. They are requesting a referral to ENT    3. Patient approves office to leave a detailed voicemail/MyChart message: N\A

## 2025-06-11 ENCOUNTER — OFFICE VISIT (OUTPATIENT)
Dept: URGENT CARE | Facility: CLINIC | Age: OVER 89
End: 2025-06-11
Payer: MEDICARE

## 2025-06-11 VITALS
BODY MASS INDEX: 21.56 KG/M2 | HEART RATE: 64 BPM | HEIGHT: 67 IN | RESPIRATION RATE: 12 BRPM | WEIGHT: 137.4 LBS | OXYGEN SATURATION: 94 % | DIASTOLIC BLOOD PRESSURE: 76 MMHG | TEMPERATURE: 98.1 F | SYSTOLIC BLOOD PRESSURE: 136 MMHG

## 2025-06-11 DIAGNOSIS — H61.23 BILATERAL IMPACTED CERUMEN: Primary | ICD-10-CM

## 2025-06-11 PROCEDURE — 3075F SYST BP GE 130 - 139MM HG: CPT | Performed by: FAMILY MEDICINE

## 2025-06-11 PROCEDURE — 99213 OFFICE O/P EST LOW 20 MIN: CPT | Performed by: FAMILY MEDICINE

## 2025-06-11 PROCEDURE — 3078F DIAST BP <80 MM HG: CPT | Performed by: FAMILY MEDICINE

## 2025-06-11 ASSESSMENT — ENCOUNTER SYMPTOMS
EYES NEGATIVE: 1
CARDIOVASCULAR NEGATIVE: 1
RESPIRATORY NEGATIVE: 1
CONSTITUTIONAL NEGATIVE: 1
GASTROINTESTINAL NEGATIVE: 1

## 2025-06-11 ASSESSMENT — FIBROSIS 4 INDEX: FIB4 SCORE: 1.25

## 2025-06-11 NOTE — PROGRESS NOTES
"Subjective:   Lynne Dawkins is a 89 y.o. female who presents for Ear Fullness (Bilateral ear fullness, has an ENT appointment.)      Ear Fullness        Review of Systems   Constitutional: Negative.    HENT:  Positive for hearing loss.    Eyes: Negative.    Respiratory: Negative.     Cardiovascular: Negative.    Gastrointestinal: Negative.        Medications, Allergies, and current problem list reviewed today in Epic.     Objective:     /76   Pulse 64   Temp 36.7 °C (98.1 °F) (Temporal)   Resp 12   Ht 1.702 m (5' 7\")   Wt 62.3 kg (137 lb 6.4 oz)   SpO2 94%     Physical Exam  Vitals and nursing note reviewed.   Constitutional:       Appearance: Normal appearance.   HENT:      Right Ear: There is impacted cerumen.      Left Ear: There is impacted cerumen.      Nose: Nose normal.   Cardiovascular:      Rate and Rhythm: Normal rate and regular rhythm.      Pulses: Normal pulses.      Heart sounds: Normal heart sounds.   Pulmonary:      Effort: Pulmonary effort is normal.      Breath sounds: Normal breath sounds.   Abdominal:      General: Abdomen is flat. Bowel sounds are normal.      Palpations: Abdomen is soft.   Neurological:      Mental Status: She is alert.         Assessment/Plan:     Diagnosis and associated orders:     1. Bilateral impacted cerumen  REMOVAL,CERUMEN,IMPACTED         Comments/MDM:              Differential diagnosis, natural history, supportive care, and indications for immediate follow-up discussed.    Advised the patient to follow-up with the primary care physician for recheck, reevaluation, and consideration of further management.    Please note that this dictation was created using voice recognition software. I have made a reasonable attempt to correct obvious errors, but I expect that there are errors of grammar and possibly content that I did not discover before finalizing the note.    This note was electronically signed by En Thompson M.D.  "

## 2025-08-13 ENCOUNTER — APPOINTMENT (OUTPATIENT)
Dept: MEDICAL GROUP | Facility: MEDICAL CENTER | Age: OVER 89
End: 2025-08-13
Payer: MEDICARE

## 2025-08-14 ENCOUNTER — OFFICE VISIT (OUTPATIENT)
Dept: MEDICAL GROUP | Facility: MEDICAL CENTER | Age: OVER 89
End: 2025-08-14
Payer: MEDICARE

## 2025-08-14 VITALS
SYSTOLIC BLOOD PRESSURE: 134 MMHG | TEMPERATURE: 98.9 F | OXYGEN SATURATION: 98 % | BODY MASS INDEX: 24.09 KG/M2 | HEART RATE: 62 BPM | WEIGHT: 141.09 LBS | DIASTOLIC BLOOD PRESSURE: 68 MMHG | HEIGHT: 64 IN

## 2025-08-14 DIAGNOSIS — I10 ESSENTIAL HYPERTENSION: ICD-10-CM

## 2025-08-14 DIAGNOSIS — Z02.89 ENCOUNTER FOR COMPLETION OF FORM WITH PATIENT: Primary | ICD-10-CM

## 2025-08-14 PROCEDURE — 3075F SYST BP GE 130 - 139MM HG: CPT | Performed by: STUDENT IN AN ORGANIZED HEALTH CARE EDUCATION/TRAINING PROGRAM

## 2025-08-14 PROCEDURE — 3078F DIAST BP <80 MM HG: CPT | Performed by: STUDENT IN AN ORGANIZED HEALTH CARE EDUCATION/TRAINING PROGRAM

## 2025-08-14 PROCEDURE — 99213 OFFICE O/P EST LOW 20 MIN: CPT | Performed by: STUDENT IN AN ORGANIZED HEALTH CARE EDUCATION/TRAINING PROGRAM

## 2025-08-14 ASSESSMENT — ENCOUNTER SYMPTOMS
VOMITING: 0
COUGH: 0
FEVER: 0
DIARRHEA: 0
HEMOPTYSIS: 0
PALPITATIONS: 0
CHILLS: 0
ABDOMINAL PAIN: 0

## 2025-08-14 ASSESSMENT — FIBROSIS 4 INDEX: FIB4 SCORE: 1.25
